# Patient Record
Sex: FEMALE | Race: WHITE | NOT HISPANIC OR LATINO | Employment: FULL TIME | URBAN - METROPOLITAN AREA
[De-identification: names, ages, dates, MRNs, and addresses within clinical notes are randomized per-mention and may not be internally consistent; named-entity substitution may affect disease eponyms.]

---

## 2017-08-02 ENCOUNTER — TRANSCRIBE ORDERS (OUTPATIENT)
Dept: ADMINISTRATIVE | Facility: HOSPITAL | Age: 42
End: 2017-08-02

## 2017-08-02 DIAGNOSIS — K63.1 PERFORATION OF INTESTINE (HCC): Primary | ICD-10-CM

## 2017-08-07 ENCOUNTER — HOSPITAL ENCOUNTER (OUTPATIENT)
Dept: CT IMAGING | Facility: HOSPITAL | Age: 42
Discharge: HOME | End: 2017-08-07
Attending: SURGERY | Admitting: SURGERY

## 2017-08-07 DIAGNOSIS — K63.1 PERFORATION OF INTESTINE (HCC): ICD-10-CM

## 2017-08-07 PROCEDURE — 74176 CT ABD & PELVIS W/O CONTRAST: CPT

## 2017-08-07 PROCEDURE — 0 DIATRIZOATE MEGLUMINE & SODIUM PER 1 ML: Performed by: SURGERY

## 2017-08-07 RX ADMIN — Medication 15 ML: at 15:30

## 2017-08-14 ENCOUNTER — TRANSCRIBE ORDERS (OUTPATIENT)
Dept: LAB | Facility: HOSPITAL | Age: 42
End: 2017-08-14

## 2017-08-14 ENCOUNTER — LAB (OUTPATIENT)
Dept: LAB | Facility: HOSPITAL | Age: 42
End: 2017-08-14

## 2017-08-14 DIAGNOSIS — Z01.818 PRE-OP EXAM: Primary | ICD-10-CM

## 2017-08-14 DIAGNOSIS — Z01.818 PRE-OP EXAM: ICD-10-CM

## 2017-08-14 LAB
ANION GAP SERPL CALCULATED.3IONS-SCNC: 5 MMOL/L (ref 3–11)
BUN BLD-MCNC: 11 MG/DL (ref 9–23)
BUN/CREAT SERPL: 15.7 (ref 7–25)
CALCIUM SPEC-SCNC: 9.4 MG/DL (ref 8.7–10.4)
CHLORIDE SERPL-SCNC: 105 MMOL/L (ref 99–109)
CO2 SERPL-SCNC: 29 MMOL/L (ref 20–31)
CREAT BLD-MCNC: 0.7 MG/DL (ref 0.6–1.3)
DEPRECATED RDW RBC AUTO: 42.4 FL (ref 37–54)
ERYTHROCYTE [DISTWIDTH] IN BLOOD BY AUTOMATED COUNT: 13.7 % (ref 11.3–14.5)
GFR SERPL CREATININE-BSD FRML MDRD: 92 ML/MIN/1.73
GLUCOSE BLD-MCNC: 79 MG/DL (ref 70–100)
HCT VFR BLD AUTO: 34.5 % (ref 34.5–44)
HGB BLD-MCNC: 11 G/DL (ref 11.5–15.5)
MCH RBC QN AUTO: 26.9 PG (ref 27–31)
MCHC RBC AUTO-ENTMCNC: 31.9 G/DL (ref 32–36)
MCV RBC AUTO: 84.4 FL (ref 80–99)
PLATELET # BLD AUTO: 337 10*3/MM3 (ref 150–450)
PMV BLD AUTO: 9.1 FL (ref 6–12)
POTASSIUM BLD-SCNC: 4.4 MMOL/L (ref 3.5–5.5)
RBC # BLD AUTO: 4.09 10*6/MM3 (ref 3.89–5.14)
SODIUM BLD-SCNC: 139 MMOL/L (ref 132–146)
WBC NRBC COR # BLD: 6.33 10*3/MM3 (ref 3.5–10.8)

## 2017-08-14 PROCEDURE — 80048 BASIC METABOLIC PNL TOTAL CA: CPT | Performed by: SURGERY

## 2017-08-14 PROCEDURE — 36415 COLL VENOUS BLD VENIPUNCTURE: CPT

## 2017-08-14 PROCEDURE — 85027 COMPLETE CBC AUTOMATED: CPT | Performed by: SURGERY

## 2017-08-28 ENCOUNTER — LAB REQUISITION (OUTPATIENT)
Dept: LAB | Facility: HOSPITAL | Age: 42
End: 2017-08-28

## 2017-08-28 DIAGNOSIS — L98.9 DISORDER OF SKIN OR SUBCUTANEOUS TISSUE: ICD-10-CM

## 2017-08-28 PROCEDURE — 88304 TISSUE EXAM BY PATHOLOGIST: CPT | Performed by: SURGERY

## 2017-08-29 LAB
CYTO UR: NORMAL
LAB AP CASE REPORT: NORMAL
LAB AP CLINICAL INFORMATION: NORMAL
Lab: NORMAL
PATH REPORT.FINAL DX SPEC: NORMAL
PATH REPORT.GROSS SPEC: NORMAL

## 2017-10-15 PROBLEM — Z01.419 WELL WOMAN EXAM: Status: ACTIVE | Noted: 2017-10-15

## 2017-10-16 ENCOUNTER — APPOINTMENT (OUTPATIENT)
Dept: LAB | Facility: HOSPITAL | Age: 42
End: 2017-10-16

## 2017-10-16 ENCOUNTER — OFFICE VISIT (OUTPATIENT)
Dept: OBSTETRICS AND GYNECOLOGY | Facility: CLINIC | Age: 42
End: 2017-10-16

## 2017-10-16 VITALS
SYSTOLIC BLOOD PRESSURE: 114 MMHG | HEIGHT: 66 IN | RESPIRATION RATE: 14 BRPM | WEIGHT: 179 LBS | BODY MASS INDEX: 28.77 KG/M2 | DIASTOLIC BLOOD PRESSURE: 74 MMHG

## 2017-10-16 DIAGNOSIS — Z01.419 WELL WOMAN EXAM: Primary | ICD-10-CM

## 2017-10-16 DIAGNOSIS — N81.2 UTEROVAGINAL PROLAPSE, INCOMPLETE: ICD-10-CM

## 2017-10-16 DIAGNOSIS — N39.46 MIXED URGE AND STRESS INCONTINENCE: ICD-10-CM

## 2017-10-16 LAB
BACTERIA UR QL AUTO: NORMAL /HPF
BILIRUB UR QL STRIP: NEGATIVE
CLARITY UR: CLEAR
COLOR UR: YELLOW
GLUCOSE UR STRIP-MCNC: NEGATIVE MG/DL
HGB UR QL STRIP.AUTO: NEGATIVE
HYALINE CASTS UR QL AUTO: NORMAL /LPF
KETONES UR QL STRIP: NEGATIVE
LEUKOCYTE ESTERASE UR QL STRIP.AUTO: NEGATIVE
NITRITE UR QL STRIP: NEGATIVE
PH UR STRIP.AUTO: 5.5 [PH] (ref 5–8)
PROT UR QL STRIP: NEGATIVE
RBC # UR: NORMAL /HPF
REF LAB TEST METHOD: NORMAL
SP GR UR STRIP: 1.02 (ref 1–1.03)
SQUAMOUS #/AREA URNS HPF: NORMAL /HPF
UROBILINOGEN UR QL STRIP: NORMAL
WBC UR QL AUTO: NORMAL /HPF

## 2017-10-16 PROCEDURE — 81001 URINALYSIS AUTO W/SCOPE: CPT | Performed by: OBSTETRICS & GYNECOLOGY

## 2017-10-16 PROCEDURE — 99396 PREV VISIT EST AGE 40-64: CPT | Performed by: OBSTETRICS & GYNECOLOGY

## 2017-10-16 NOTE — PROGRESS NOTES
Subjective   Chief Complaint   Patient presents with   • Gynecologic Exam     Paula Quispe is a 42 y.o. year old  presenting to be seen for her annual exam.     SEXUAL Hx:  She is currently sexually active.  In the past year there has not been new sexual partners.    Condoms are never used.  She would not like to be screened for STD's at today's exam.  Current birth control method: vasectomy.  She is happy with her current method of contraception and does not want to discuss alternative methods of contraception.  MENSTRUAL Hx:  Patient's last menstrual period was 2017 (exact date).  In the past 6 months her cycles have been regular, predictable and occur monthly.  Her menstrual flow is typically normal.   Each month on average there are roughly 0 day(s) of very heavy flow.    Intermenstrual bleeding is absent.    Post-coital bleeding is absent.  Dysmenorrhea: is not affecting her activities of daily living  PMS: is not affecting her activities of daily living  Her cycles are not a source of concern for her that she wishes to discuss today.  HEALTH Hx:  She exercises regularly: yes.  She wears her seat belt: yes.  She has concerns about domestic violence: no.  OTHER THINGS SHE WANTS TO DISCUSS TODAY:  Nothing else    The following portions of the patient's history were reviewed and updated as appropriate:problem list, current medications, allergies, past family history, past medical history, past social history and past surgical history.    Smoking status: Never Smoker                                                              Smokeless status: Never Used                        Review of Systems  Constitutional POS: nothing reported    NEG: anorexia or night sweats   Gastointestinal POS: nothing reported    NEG: bloating, change in bowel habits, melena or reflux symptoms   Genitourinary POS: both stress and urge incontinence are present and it IS effecting her ADL's    NEG: dysuria or hematuria  "  Integument POS: nothing reported    NEG: moles that are changing in size, shape, color or rashes   Breast POS: nothing reported    NEG: persistent breast lump, skin dimpling or nipple discharge        Objective   /74  Resp 14  Ht 66\" (167.6 cm)  Wt 179 lb (81.2 kg)  LMP 09/23/2017 (Exact Date)  Breastfeeding? No  BMI 28.89 kg/m2    General:  well developed; well nourished  no acute distress   Skin:  No suspicious lesions seen   Thyroid: normal to inspection and palpation   Breasts:  Examined in supine position  Symmetric without masses or skin dimpling  Nipples normal without inversion, lesions or discharge  There are no palpable axillary nodes   Abdomen: soft, non-tender; no masses  no umbilical or inginual hernias are present  no hepato-splenomegaly   Pelvis: Clinical staff was present for exam  External genitalia:  normal appearance of the external genitalia including Bartholin's and [place]'s glands.  :  urethral meatus normal; urethra hypermobile;  Vaginal:  normal pink mucosa without prolapse or lesions.  Cervix:  normal appearance.  Uterus:  normal size, shape and consistency. retroverted;  Adnexa:  non palpable bilaterally.  Rectal:  digital rectal exam not performed; anus visually normal appearing.  Cystocele GRADE 3  Rectocele GRADE 1  Uterine GRADE 2        Assessment   1. Pelvic organ prolapse - as compared to the prior exams it is progressively worse.  It is symptomatic and affecting her quality of life.  2. HARRIET - affecting her activities of daily living.     Plan   1. Pap was done today.  If she does not receive the results of the Pap within 2 weeks  time, she was instructed to call to find out the results.  I explained to Paula that the recommendations for Pap smear interval in a low risk patient's has lengthened to 3 years time.  I encouraged her to be seen yearly for a full physical exam including breast and pelvic exam even during the off years when PAP's will not be " performed.  2. She was encouraged to get yearly mammograms.  She should report any palpable breast lump(s) or skin changes regardless of mammographic findings.  I explained to Paula that notification regarding her mammogram results will come from the center performing the study.  Our office will not be routinely calling with mammogram results.  It is her responsibility to make sure that the results from the mammogram are communicated to her by the breast center.  If she has any questions about the results, she is welcome to call our office anytime.  3. The following tests were ordered today: UA and UA with culture if indicated.  It was explained to Paula that all lab test should be back within the one week after they are performed. She will be notified about the results, regardless of the findings. If she has not been contacted by the office within 2 weeks after the test has been performed, it is her responsibility to contact us to learn about her results.  4. The importance of keeping all planned follow-up and taking all medications as prescribed was emphasized.  5. Follow up with Urolog          This note was electronically signed.    Kerwin Paez M.D.  October 16, 2017    Note: Speech recognition transcription software may have been used to create portions of this document.  An attempt at proofreading has been made but errors in transcription could still be present.

## 2017-10-24 PROCEDURE — 87147 CULTURE TYPE IMMUNOLOGIC: CPT | Performed by: SURGERY

## 2017-10-24 PROCEDURE — 87075 CULTR BACTERIA EXCEPT BLOOD: CPT | Performed by: SURGERY

## 2017-10-24 PROCEDURE — 87077 CULTURE AEROBIC IDENTIFY: CPT | Performed by: SURGERY

## 2017-10-24 PROCEDURE — 87186 SC STD MICRODIL/AGAR DIL: CPT | Performed by: SURGERY

## 2017-10-24 PROCEDURE — 87205 SMEAR GRAM STAIN: CPT | Performed by: SURGERY

## 2017-10-24 PROCEDURE — 87070 CULTURE OTHR SPECIMN AEROBIC: CPT | Performed by: SURGERY

## 2017-10-25 ENCOUNTER — LAB REQUISITION (OUTPATIENT)
Dept: LAB | Facility: HOSPITAL | Age: 42
End: 2017-10-25

## 2017-10-25 DIAGNOSIS — L02.91 CUTANEOUS ABSCESS: ICD-10-CM

## 2017-10-30 LAB
BACTERIA SPEC AEROBE CULT: ABNORMAL
BACTERIA SPEC AEROBE CULT: ABNORMAL
GRAM STN SPEC: ABNORMAL
GRAM STN SPEC: ABNORMAL

## 2017-11-01 LAB — BACTERIA SPEC ANAEROBE CULT: NORMAL

## 2018-10-18 ENCOUNTER — OFFICE VISIT (OUTPATIENT)
Dept: OBSTETRICS AND GYNECOLOGY | Facility: CLINIC | Age: 43
End: 2018-10-18

## 2018-10-18 VITALS
WEIGHT: 181 LBS | RESPIRATION RATE: 14 BRPM | SYSTOLIC BLOOD PRESSURE: 116 MMHG | BODY MASS INDEX: 29.21 KG/M2 | DIASTOLIC BLOOD PRESSURE: 72 MMHG

## 2018-10-18 DIAGNOSIS — Z01.419 WELL WOMAN EXAM: Primary | ICD-10-CM

## 2018-10-18 PROCEDURE — 99396 PREV VISIT EST AGE 40-64: CPT | Performed by: OBSTETRICS & GYNECOLOGY

## 2018-10-18 NOTE — PROGRESS NOTES
Subjective   Chief Complaint   Patient presents with   • Gynecologic Exam     Paula Quispe is a 43 y.o. year old  presenting to be seen for her annual exam.     SEXUAL Hx:  She is currently sexually active.  In the past year there there has been NO new sexual partners.    Condoms are never used.  She would not like to be screened for STD's at today's exam.  Current birth control method: vasectomy.  She is happy with her current method of contraception and does not want to discuss alternative methods of contraception.  MENSTRUAL Hx:  Patient's last menstrual period was 10/01/2018 (approximate).  In the past 6 months her cycles have been regular, predictable and occur monthly.  Her menstrual flow is typically normal.   Each month on average there are roughly 0 day(s) of very heavy flow.    Intermenstrual bleeding is absent.    Post-coital bleeding is absent.  Dysmenorrhea: is not affecting her activities of daily living  PMS: is not affecting her activities of daily living  Her cycles are not a source of concern for her that she wishes to discuss today.  HEALTH Hx:  She exercises regularly: no (but is planning to start exercising more ).  She wears her seat belt: yes.  She has concerns about domestic violence: no.  OTHER THINGS SHE WANTS TO DISCUSS TODAY:  Nothing else    The following portions of the patient's history were reviewed and updated as appropriate:problem list, current medications, allergies, past family history, past medical history, past social history and past surgical history.    Smoking status: Never Smoker                                                              Smokeless tobacco: Never Used                        Review of Systems  Constitutional POS: nothing reported    NEG: anorexia or night sweats   Genitourinary POS: both stress and urge incontinence are present and it IS effecting her ADL's    NEG: dysuria or hematuria      Gastointestinal POS: nothing reported    NEG:  bloating, change in bowel habits, melena or reflux symptoms   Integument POS: nothing reported    NEG: moles that are changing in size, shape, color or rashes   Breast POS: nothing reported    NEG: persistent breast lump, skin dimpling or nipple discharge        Objective   /72   Resp 14   Wt 82.1 kg (181 lb)   LMP 10/01/2018 (Approximate)   Breastfeeding? No   BMI 29.21 kg/m²     General:  well developed; well nourished  no acute distress   Skin:  No suspicious lesions seen   Thyroid: normal to inspection and palpation   Breasts:  Examined in supine position  Symmetric without masses or skin dimpling  Nipples normal without inversion, lesions or discharge  There are no palpable axillary nodes   Abdomen: soft, non-tender; no masses  no umbilical or inginual hernias are present  no hepato-splenomegaly   Pelvis: Clinical staff was present for exam  External genitalia:  normal appearance of the external genitalia including Bartholin's and [place]'s glands.  :  urethral meatus normal;  Vaginal:  normal pink mucosa without prolapse or lesions.  Cervix:  normal appearance.  Uterus:  normal size, shape and consistency.  Adnexa:  non palpable bilaterally.  Rectal:  digital rectal exam not performed; anus visually normal appearing.  Cystocele GRADE 2  Rectocele GRADE 2  Uterine GRADE 2        Assessment   Pelvic organ prolapse - not significantly different from prior exams.  It is now symptomatic and affecting her quality of life.  HARRIET - affecting her activities of daily living.     Plan   1. Pap was not done today.  I explained to Paula that the recommendations for Pap smear interval in a low risk patient has lengthened to 3 years time.  I told Paula she still needs to be seen in our office yearly for a full physical including breast and pelvic exam.  2. She was encouraged to get yearly mammograms.  She should report any palpable breast lump(s) or skin changes regardless of mammographic findings.  I  explained to Paula that notification regarding her mammogram results will come from the center performing the study.  Our office will not be routinely calling with mammogram results.  It is her responsibility to make sure that the results from the mammogram are communicated to her by the breast center.  If she has any questions about the results, she is welcome to call our office anytime.  3. Still would recommend completing urinary log and following back up afterwards to determine next steps in the process to help with her bladder leakage  4. The importance of keeping all planned follow-up and taking all medications as prescribed was emphasized.  5. Follow up PRN or 1 year for annual exam            This note was electronically signed.    Kerwin Paez M.D.  October 18, 2018    Note: Speech recognition transcription software may have been used to create portions of this document.  An attempt at proofreading has been made but errors in transcription could still be present.

## 2019-06-19 ENCOUNTER — TRANSCRIBE ORDERS (OUTPATIENT)
Dept: ADMINISTRATIVE | Facility: HOSPITAL | Age: 44
End: 2019-06-19

## 2019-06-19 DIAGNOSIS — Z12.39 ENCOUNTER FOR SCREENING FOR MALIGNANT NEOPLASM OF BREAST: Primary | ICD-10-CM

## 2019-06-19 DIAGNOSIS — Z12.31 VISIT FOR SCREENING MAMMOGRAM: Primary | ICD-10-CM

## 2019-06-20 ENCOUNTER — HOSPITAL ENCOUNTER (OUTPATIENT)
Dept: MAMMOGRAPHY | Facility: HOSPITAL | Age: 44
Discharge: HOME | End: 2019-06-20
Admitting: OBSTETRICS & GYNECOLOGY

## 2019-06-20 DIAGNOSIS — Z12.31 VISIT FOR SCREENING MAMMOGRAM: ICD-10-CM

## 2019-06-20 PROCEDURE — 77063 BREAST TOMOSYNTHESIS BI: CPT

## 2019-06-20 PROCEDURE — 77063 BREAST TOMOSYNTHESIS BI: CPT | Performed by: RADIOLOGY

## 2019-06-20 PROCEDURE — 77067 SCR MAMMO BI INCL CAD: CPT

## 2019-06-20 PROCEDURE — 77067 SCR MAMMO BI INCL CAD: CPT | Performed by: RADIOLOGY

## 2019-10-20 NOTE — PROGRESS NOTES
Subjective   Chief Complaint   Patient presents with   • Gynecologic Exam     Paula Quispe is a 44 y.o. year old  presenting to be seen for her annual exam.     SEXUAL Hx:  She is currently sexually active.  In the past year there there has been NO new sexual partners.    Condoms are never used.  She would not like to be screened for STD's at today's exam.  Current birth control method: vasectomy.  She is happy with her current method of contraception and does not want to discuss alternative methods of contraception.  MENSTRUAL Hx:  Patient's last menstrual period was 10/12/2019 (approximate).  In the past 6 months her cycles have been regular, predictable and occur monthly.  Her menstrual flow is typically normal.   Each month on average there are roughly 0 day(s) of very heavy flow.    Intermenstrual bleeding is absent.    Post-coital bleeding is absent.  Dysmenorrhea: is not affecting her activities of daily living  PMS: is not affecting her activities of daily living  Her cycles are not a source of concern for her that she wishes to discuss today.  HEALTH Hx:  She exercises regularly: no (and has no plans to become more active).  She wears her seat belt: yes.  She has concerns about domestic violence: no.  OTHER THINGS SHE WANTS TO DISCUSS TODAY:  Nothing else    The following portions of the patient's history were reviewed and updated as appropriate:problem list, current medications, allergies, past family history, past medical history, past social history and past surgical history.    Social History    Tobacco Use      Smoking status: Never Smoker      Smokeless tobacco: Never Used    Review of Systems  Constitutional POS: nothing reported    NEG: anorexia or night sweats   Genitourinary POS: both stress and urge incontinence are  present but it IS NOT effecting her ADL's    NEG: dysuria or hematuria      Gastointestinal POS: diarrhea and it IS NOT effecting her daily living    NEG: bloating,  change in bowel habits, melena or reflux symptoms   Integument POS: nothing reported    NEG: moles that are changing in size, shape, color or rashes   Breast POS: nothing reported    NEG: persistent breast lump, skin dimpling or nipple discharge        Objective   /74   Resp 14   Wt 84.4 kg (186 lb)   LMP 10/12/2019 (Approximate)   Breastfeeding? No   BMI 30.02 kg/m²     General:  well developed; well nourished  no acute distress   Skin:  No suspicious lesions seen   Thyroid: normal to inspection and palpation   Breasts:  Examined in supine position  Symmetric without masses or skin dimpling  Nipples normal without inversion, lesions or discharge  There are no palpable axillary nodes   Abdomen: soft, non-tender; no masses  no umbilical or inguinal hernias are present  no hepato-splenomegaly   Pelvis: Clinical staff was present for exam  External genitalia:  normal appearance of the external genitalia including Bartholin's and [place]'s glands.  :  urethral meatus normal;  Vaginal:  normal pink mucosa without prolapse or lesions.  Cervix:  normal appearance.  Uterus:  normal size, shape and consistency.  Adnexa:  normal bimanual exam of the adnexa.  Rectal:  digital rectal exam not performed; anus visually normal appearing.  Cystocele GRADE 2  Rectocele GRADE 2  Uterine GRADE 2        Assessment   1. Normal GYN exam with pelvic organ prolapse - not significantly different from prior exams.  It is minimally symptomatic and not affecting her quality of life.  2. HARRIET - not impacting day-to-day function enough that she wishes to pursue     Plan   1. Pap was not done today.  I explained to Paula that the recommendations for Pap smear interval in a low risk patient has lengthened to 3 years time.  I told Paula she still needs to be seen in our office yearly for a full physical including breast and pelvic exam.  2. She was encouraged to get yearly mammograms.  She should report any palpable breast lump(s)  or skin changes regardless of mammographic findings.  I explained to Paula that notification regarding her mammogram results will come from the center performing the study.  Our office will not be routinely calling with mammogram results.  It is her responsibility to make sure that the results from the mammogram are communicated to her by the breast center.  If she has any questions about the results, she is welcome to call our office anytime.  3. The importance of keeping all planned follow-up and taking all medications as prescribed was emphasized.  4. Follow up for annual exam 1 year         This note was electronically signed.    Kerwin Paez M.D.  October 23, 2019    Note: Speech recognition transcription software may have been used to create portions of this document.  An attempt at proofreading has been made but errors in transcription could still be present.

## 2019-10-23 ENCOUNTER — OFFICE VISIT (OUTPATIENT)
Dept: OBSTETRICS AND GYNECOLOGY | Facility: CLINIC | Age: 44
End: 2019-10-23

## 2019-10-23 VITALS
BODY MASS INDEX: 30.02 KG/M2 | SYSTOLIC BLOOD PRESSURE: 118 MMHG | RESPIRATION RATE: 14 BRPM | WEIGHT: 186 LBS | DIASTOLIC BLOOD PRESSURE: 74 MMHG

## 2019-10-23 DIAGNOSIS — Z01.419 WELL WOMAN EXAM: Primary | ICD-10-CM

## 2019-10-23 PROCEDURE — 99396 PREV VISIT EST AGE 40-64: CPT | Performed by: OBSTETRICS & GYNECOLOGY

## 2020-10-26 NOTE — PATIENT INSTRUCTIONS
Tdap Vaccine (Tetanus, Diphtheria and Pertussis): What You Need to Know      1. Why get vaccinated?  Tetanus, diphtheria and pertussis are very serious diseases. Tdap vaccine can protect us from these diseases. And, Tdap vaccine given to pregnant women can protect  babies against pertussis..  TETANUS (Lockjaw) is rare in the [place] today. It causes painful muscle tightening and stiffness, usually all over the body.  · It can lead to tightening of muscles in the head and neck so you can't open your mouth, swallow, or sometimes even breathe. Tetanus kills about 1 out of 10 people who are infected even after receiving the best medical care.  DIPHTHERIA is also rare in the [place] today. It can cause a thick coating to form in the back of the throat.  · It can lead to breathing problems, heart failure, paralysis, and death.  PERTUSSIS (Whooping Cough) causes severe coughing spells, which can cause difficulty breathing, vomiting and disturbed sleep.  · It can also lead to weight loss, incontinence, and rib fractures. Up to 2 in 100 adolescents and 5 in 100 adults with pertussis are hospitalized or have complications, which could include pneumonia or death.    These diseases are caused by bacteria. Diphtheria and pertussis are spread from person to person through secretions from coughing or sneezing. Tetanus enters the body through cuts, scratches, or wounds.  Before vaccines, as many as 200,000 cases of diphtheria, 200,000 cases of pertussis, and hundreds of cases of tetanus, were reported in the [place] each year. Since vaccination began, reports of cases for tetanus and diphtheria have dropped by about 99% and for pertussis by about 80%.    2. Tdap vaccine  • Tdap vaccine can protect adolescents and adults from tetanus, diphtheria, and pertussis. One dose of Tdap is routinely given at age 11 or 12. People who did not get Tdap at that age should get it as soon as possible.  • Tdap is especially  important for healthcare professionals and anyone having close contact with a baby younger than 12 months.  • Pregnant women should get a dose of Tdap during every pregnancy, to protect the  from pertussis. Infants are most at risk for severe, life-threatening complications from pertussis.  • Another vaccine, called Td, protects against tetanus and diphtheria, but not pertussis. A Td booster should be given every 10 years. Tdap may be given as one of these boosters if you have never gotten Tdap before. Tdap may also be given after a severe cut or burn to prevent tetanus infection.  • Your doctor or the person giving you the vaccine can give you more information.  • Tdap may safely be given at the same time as other vaccines.    3. Some people should not get this vaccine  · A person who has ever had a life-threatening allergic reaction after a previous dose of any diphtheria, tetanus or pertussis containing vaccine, OR has a severe allergy to any part of this vaccine, should not get Tdap vaccine. Tell the person giving the vaccine about any severe allergies.  · Anyone who had coma or long repeated seizures within 7 days after a childhood dose of DTP or DTaP, or a previous dose of Tdap, should not get Tdap, unless a cause other than the vaccine was found. They can still get Td.  · Talk to your doctor if you:  ? have seizures or another nervous system problem,  ? had severe pain or swelling after any vaccine containing diphtheria, tetanus or pertussis,  ? ever had a condition called Guillain-Barré Syndrome (GBS),  ? aren't feeling well on the day the shot is scheduled.    4. Risks  With any medicine, including vaccines, there is a chance of side effects. These are usually mild and go away on their own. Serious reactions are also possible but are rare.  Most people who get Tdap vaccine do not have any problems with it.  Mild problems following Tdap  (Did not interfere with activities)  · Pain where the shot was  given (about 3 in 4 adolescents or 2 in 3 adults)  · Redness or swelling where the shot was given (about 1 person in 5)  · Mild fever of at least 100.4°F (up to about 1 in 25 adolescents or 1 in 100 adults)  · Headache (about 3 or 4 people in 10)  · Tiredness (about 1 person in 3 or 4)  · Nausea, vomiting, diarrhea, stomach ache (up to 1 in 4 adolescents or 1 in 10 adults)  · Chills, sore joints (about 1 person in 10)  · Body aches (about 1 person in 3 or 4)  · Rash, swollen glands (uncommon)  Moderate problems following Tdap  (Interfered with activities, but did not require medical attention)  · Pain where the shot was given (up to 1 in 5 or 6)  · Redness or swelling where the shot was given (up to about 1 in 16 adolescents or 1 in 12 adults)  · Fever over 102°F (about 1 in 100 adolescents or 1 in 250 adults)  · Headache (about 1 in 7 adolescents or 1 in 10 adults)  · Nausea, vomiting, diarrhea, stomach ache (up to 1 or 3 people in 100)  · Swelling of the entire arm where the shot was given (up to about 1 in 500).  Severe problems following Tdap  (Unable to perform usual activities; required medical attention)  · Swelling, severe pain, bleeding and redness in the arm where the shot was given (rare).  Problems that could happen after any vaccine:  · People sometimes faint after a medical procedure, including vaccination. Sitting or lying down for about 15 minutes can help prevent fainting, and injuries caused by a fall. Tell your doctor if you feel dizzy, or have vision changes or ringing in the ears.  · Some people get severe pain in the shoulder and have difficulty moving the arm where a shot was given. This happens very rarely.  · Any medication can cause a severe allergic reaction. Such reactions from a vaccine are very rare, estimated at fewer than 1 in a million doses, and would happen within a few minutes to a few hours after the vaccination.  · As with any medicine, there is a very remote chance of a vaccine  causing a serious injury or death.  · The safety of vaccines is always being monitored. For more information, visit: www.cdc.gov/vaccinesafety/    5. What if there is a serious problem?  What should I look for?  · Look for anything that concerns you, such as signs of a severe allergic reaction, very high fever, or unusual behavior.  · Signs of a severe allergic reaction can include hives, swelling of the face and throat, difficulty breathing, a fast heartbeat, dizziness, and weakness. These would usually start a few minutes to a few hours after the vaccination.  What should I do?  · If you think it is a severe allergic reaction or other emergency that can't wait, call 9-1-1 or get the person to the nearest hospital. Otherwise, call your doctor.  · Afterward, the reaction should be reported to the Vaccine Adverse Event Reporting System (VAERS). Your doctor might file this report, or you can do it yourself through the VAERS web site at www.vaers.[facility].gov, or by calling 1-301.973.3029.  · VAERS does not give medical advice.    6. The National Vaccine Injury Compensation Program  The National Vaccine Injury Compensation Program (VICP) is a federal program that was created to compensate people who may have been injured by certain vaccines.  Persons who believe they may have been injured by a vaccine can learn about the program and about filing a claim by calling 1-853.124.3820 or visiting the VICP website at www.hrsa.gov/vaccinecompensation. There is a time limit to file a claim for compensation.    7. How can I learn more?  · Ask your doctor. He or she can give you the vaccine package insert or suggest other sources of information.  · Call your local or state health department.  · Contact the Centers for Disease Control and Prevention (CDC):  ? Call 1-645.239.9903 (2-578-XDL-INFO) or  ? Visit CDC's website at www.cdc.gov/vaccines      Vaccine Information Statement Tdap Vaccine (2/24/2015)  This information is not  intended to replace advice given to you by your health care provider. Make sure you discuss any questions you have with your health care provider.  Document Released: 06/18/2013 Document Revised: 08/05/2019 Document Reviewed: 08/05/2019  [employer]vier Interactive Patient Education © 2019 [employer]

## 2020-10-26 NOTE — PROGRESS NOTES
Subjective   Chief Complaint   Patient presents with   • Gynecologic Exam     Paula Quispe is a 45 y.o. year old  presenting to be seen for her annual exam.     She has been having some back and left-sided sciatic pain.  She is afraid it could be recurrence of her disc disease for which she had operation on in the past.    SEXUAL Hx:  She is currently sexually active.  In the past year there there has been NO new sexual partners.    Condoms are never used.  She would not like to be screened for STD's at today's exam.  Current birth control method: vasectomy.  She is happy with her current method of contraception and does not want to discuss alternative methods of contraception.  MENSTRUAL Hx:  Patient's last menstrual period was 2020 (approximate).  In the past 6 months her cycles have been regular, predictable and occur monthly.  Her menstrual flow is typically normal.   Each month on average there are roughly 0 day(s) of very heavy flow.  Intermenstrual bleeding is absent.    Post-coital bleeding is absent.  Dysmenorrhea: is not affecting her activities of daily living  PMS: is not affecting her activities of daily living  Her cycles are not a source of concern for her that she wishes to discuss today.  HEALTH Hx:  She exercises regularly: no (and has no plans to become more active).  She wears her seat belt: yes.  She has concerns about domestic violence: no.  OTHER THINGS SHE WANTS TO DISCUSS TODAY:  Nothing else    The following portions of the patient's history were reviewed and updated as appropriate:problem list, current medications, allergies, past family history, past medical history, past social history and past surgical history.    Social History    Tobacco Use      Smoking status: Never Smoker      Smokeless tobacco: Never Used    Review of Systems  Constitutional POS: nothing reported    NEG: anorexia or night sweats   Genitourinary POS: PANCHO is present but it IS NOT effecting her  ADL's    NEG: dysuria or hematuria      Gastointestinal POS: nothing reported    NEG: bloating, change in bowel habits, melena or reflux symptoms   Integument POS: nothing reported    NEG: moles that are changing in size, shape, color or rashes   Breast POS: nothing reported    NEG: persistent breast lump, skin dimpling or nipple discharge        Objective   /70   Resp 14   Wt 87.1 kg (192 lb)   LMP 09/29/2020 (Approximate)   Breastfeeding No   BMI 30.99 kg/m²     General:  well developed; well nourished  no acute distress   Skin:  No suspicious lesions seen   Thyroid: normal to inspection and palpation   Breasts:  Examined in supine position  Symmetric without masses or skin dimpling  Nipples normal without inversion, lesions or discharge  There are no palpable axillary nodes   Abdomen: soft, non-tender; no masses  no umbilical or inguinal hernias are present  no hepato-splenomegaly   Pelvis: Clinical staff was present for exam  External genitalia:  normal appearance of the external genitalia including Bartholin's and [place]'s glands.  :  urethral meatus normal;  Vaginal:  normal pink mucosa without prolapse or lesions.  Cervix:  normal appearance.  Uterus:  normal size, shape and consistency. retroverted;  Adnexa:  normal bimanual exam of the adnexa.  Rectal:  digital rectal exam not performed; anus visually normal appearing.  Cystocele GRADE 2  Rectocele GRADE 2  Uterine GRADE 2        Assessment   1. Normal GYN exam with pelvic organ prolapse - not significantly different from prior exams.  It is minimally symptomatic and not affecting her quality of life.  2. HARRIET - not significantly affecting her activities of daily living.     Plan   1. Pap was done today.  If she does not receive the results of the Pap within 2 weeks  time, she was instructed to call to find out the results.  I explained to Paula that the recommendations for Pap smear interval in a low risk patient's has lengthened to 3 years  time.  I encouraged her to be seen yearly for a full physical exam including breast and pelvic exam even during the off years when PAP's will not be performed.  2. She was encouraged to get yearly mammograms.  She should report any palpable breast lump(s) or skin changes regardless of mammographic findings.  I explained to Paula that notification regarding her mammogram results will come from the center performing the study.  Our office will not be routinely calling with mammogram results.  It is her responsibility to make sure that the results from the mammogram are communicated to her by the breast center.  If she has any questions about the results, she is welcome to call our office anytime.  3. Colonoscopy was recommended for screening for colon cancer.  The procedure was briefly discussed and its benefits for early detection of colon cancer were emphasized.  I explained to Paula that we could help her to schedule it if she wishes.  Additionally, she could also contact her primary care physician to help make this arrangement.  Alternatively, she could consider Cologuard (which would need to be done every 3 years).  If Cologuard was abnormal, colonoscopy would be required in follow-up.  After considering these options she wants help setting up her colonoscopy.  Referral will be made to Dr. Page for outpatient colonoscopy.  4. I discussed with Paula that she may be behind on needed vaccinations for TDAP.  She may be able to obtain these vaccinations at her local pharmacy OR speak about obtaining them with her primary care.  If she does obtain her vaccines, I have asked Paula to let us know the date each vaccine was obtained so that her medical record could be updated in our system.  5. The importance of keeping all planned follow-up and taking all medications as prescribed was emphasized.  6. Follow up for annual exam 1 year         This note was electronically signed.    Kerwin Paez,  M.D.  October 28, 2020    Note: Speech recognition transcription software may have been used to create portions of this document.  An attempt at proofreading has been made but errors in transcription could still be present.

## 2020-10-28 ENCOUNTER — OFFICE VISIT (OUTPATIENT)
Dept: OBSTETRICS AND GYNECOLOGY | Facility: CLINIC | Age: 45
End: 2020-10-28

## 2020-10-28 VITALS
RESPIRATION RATE: 14 BRPM | WEIGHT: 192 LBS | BODY MASS INDEX: 30.99 KG/M2 | SYSTOLIC BLOOD PRESSURE: 118 MMHG | DIASTOLIC BLOOD PRESSURE: 70 MMHG

## 2020-10-28 DIAGNOSIS — N81.2 UTEROVAGINAL PROLAPSE, INCOMPLETE: ICD-10-CM

## 2020-10-28 DIAGNOSIS — N39.46 MIXED URGE AND STRESS INCONTINENCE: ICD-10-CM

## 2020-10-28 DIAGNOSIS — Z71.85 VACCINE COUNSELING: ICD-10-CM

## 2020-10-28 DIAGNOSIS — Z01.419 WELL WOMAN EXAM: Primary | ICD-10-CM

## 2020-10-28 DIAGNOSIS — Z12.11 SCREEN FOR COLON CANCER: ICD-10-CM

## 2020-10-28 PROCEDURE — 99396 PREV VISIT EST AGE 40-64: CPT | Performed by: OBSTETRICS & GYNECOLOGY

## 2020-11-29 ENCOUNTER — APPOINTMENT (OUTPATIENT)
Dept: PREADMISSION TESTING | Facility: HOSPITAL | Age: 45
End: 2020-11-29

## 2021-03-08 ENCOUNTER — APPOINTMENT (OUTPATIENT)
Dept: PREADMISSION TESTING | Facility: HOSPITAL | Age: 46
End: 2021-03-08

## 2021-03-22 DIAGNOSIS — Z12.11 SCREENING FOR COLON CANCER: Primary | ICD-10-CM

## 2021-05-14 DIAGNOSIS — Z12.11 SCREENING FOR COLON CANCER: ICD-10-CM

## 2021-05-19 ENCOUNTER — OUTSIDE FACILITY SERVICE (OUTPATIENT)
Dept: GASTROENTEROLOGY | Facility: CLINIC | Age: 46
End: 2021-05-19

## 2021-05-19 PROCEDURE — 45380 COLONOSCOPY AND BIOPSY: CPT | Performed by: INTERNAL MEDICINE

## 2021-05-19 PROCEDURE — 88305 TISSUE EXAM BY PATHOLOGIST: CPT | Performed by: INTERNAL MEDICINE

## 2021-05-20 ENCOUNTER — LAB REQUISITION (OUTPATIENT)
Dept: LAB | Facility: HOSPITAL | Age: 46
End: 2021-05-20

## 2021-05-20 DIAGNOSIS — Z12.11 ENCOUNTER FOR SCREENING FOR MALIGNANT NEOPLASM OF COLON: ICD-10-CM

## 2021-05-21 LAB
CYTO UR: NORMAL
LAB AP CASE REPORT: NORMAL
LAB AP CLINICAL INFORMATION: NORMAL
PATH REPORT.FINAL DX SPEC: NORMAL
PATH REPORT.GROSS SPEC: NORMAL

## 2021-09-23 ENCOUNTER — LAB (OUTPATIENT)
Dept: LAB | Facility: HOSPITAL | Age: 46
End: 2021-09-23

## 2021-09-23 ENCOUNTER — OFFICE VISIT (OUTPATIENT)
Dept: GASTROENTEROLOGY | Facility: CLINIC | Age: 46
End: 2021-09-23

## 2021-09-23 VITALS
HEIGHT: 66 IN | TEMPERATURE: 97.1 F | WEIGHT: 156 LBS | BODY MASS INDEX: 25.07 KG/M2 | OXYGEN SATURATION: 94 % | SYSTOLIC BLOOD PRESSURE: 122 MMHG | DIASTOLIC BLOOD PRESSURE: 68 MMHG | HEART RATE: 86 BPM

## 2021-09-23 DIAGNOSIS — K56.600 PARTIAL INTESTINAL OBSTRUCTION, UNSPECIFIED CAUSE (HCC): Primary | ICD-10-CM

## 2021-09-23 DIAGNOSIS — K56.600 PARTIAL INTESTINAL OBSTRUCTION, UNSPECIFIED CAUSE (HCC): ICD-10-CM

## 2021-09-23 PROBLEM — M20.10 ACQUIRED HALLUX VALGUS: Status: ACTIVE | Noted: 2021-09-23

## 2021-09-23 PROBLEM — L57.0 ACTINIC KERATOSIS: Status: ACTIVE | Noted: 2021-09-23

## 2021-09-23 LAB
ALBUMIN SERPL-MCNC: 4.1 G/DL (ref 3.5–5.2)
ALBUMIN/GLOB SERPL: 1.4 G/DL
ALP SERPL-CCNC: 81 U/L (ref 39–117)
ALT SERPL W P-5'-P-CCNC: 27 U/L (ref 1–33)
ANION GAP SERPL CALCULATED.3IONS-SCNC: 10.8 MMOL/L (ref 5–15)
AST SERPL-CCNC: 25 U/L (ref 1–32)
BASOPHILS # BLD AUTO: 0.03 10*3/MM3 (ref 0–0.2)
BASOPHILS NFR BLD AUTO: 0.5 % (ref 0–1.5)
BILIRUB SERPL-MCNC: 0.3 MG/DL (ref 0–1.2)
BUN SERPL-MCNC: 10 MG/DL (ref 6–20)
BUN/CREAT SERPL: 13.5 (ref 7–25)
CALCIUM SPEC-SCNC: 8.8 MG/DL (ref 8.6–10.5)
CHLORIDE SERPL-SCNC: 107 MMOL/L (ref 98–107)
CO2 SERPL-SCNC: 23.2 MMOL/L (ref 22–29)
CREAT SERPL-MCNC: 0.74 MG/DL (ref 0.57–1)
DEPRECATED RDW RBC AUTO: 38.9 FL (ref 37–54)
EOSINOPHIL # BLD AUTO: 0.11 10*3/MM3 (ref 0–0.4)
EOSINOPHIL NFR BLD AUTO: 2 % (ref 0.3–6.2)
ERYTHROCYTE [DISTWIDTH] IN BLOOD BY AUTOMATED COUNT: 12.8 % (ref 12.3–15.4)
GFR SERPL CREATININE-BSD FRML MDRD: 84 ML/MIN/1.73
GLOBULIN UR ELPH-MCNC: 2.9 GM/DL
GLUCOSE SERPL-MCNC: 93 MG/DL (ref 65–99)
HCT VFR BLD AUTO: 33.4 % (ref 34–46.6)
HGB BLD-MCNC: 11.1 G/DL (ref 12–15.9)
IMM GRANULOCYTES # BLD AUTO: 0.02 10*3/MM3 (ref 0–0.05)
IMM GRANULOCYTES NFR BLD AUTO: 0.4 % (ref 0–0.5)
LYMPHOCYTES # BLD AUTO: 1.02 10*3/MM3 (ref 0.7–3.1)
LYMPHOCYTES NFR BLD AUTO: 18.4 % (ref 19.6–45.3)
MCH RBC QN AUTO: 28.5 PG (ref 26.6–33)
MCHC RBC AUTO-ENTMCNC: 33.2 G/DL (ref 31.5–35.7)
MCV RBC AUTO: 85.6 FL (ref 79–97)
MONOCYTES # BLD AUTO: 0.41 10*3/MM3 (ref 0.1–0.9)
MONOCYTES NFR BLD AUTO: 7.4 % (ref 5–12)
NEUTROPHILS NFR BLD AUTO: 3.96 10*3/MM3 (ref 1.7–7)
NEUTROPHILS NFR BLD AUTO: 71.3 % (ref 42.7–76)
NRBC BLD AUTO-RTO: 0 /100 WBC (ref 0–0.2)
PLATELET # BLD AUTO: 322 10*3/MM3 (ref 140–450)
PMV BLD AUTO: 9.4 FL (ref 6–12)
POTASSIUM SERPL-SCNC: 3.3 MMOL/L (ref 3.5–5.2)
PROT SERPL-MCNC: 7 G/DL (ref 6–8.5)
RBC # BLD AUTO: 3.9 10*6/MM3 (ref 3.77–5.28)
SODIUM SERPL-SCNC: 141 MMOL/L (ref 136–145)
VIT B12 BLD-MCNC: 360 PG/ML (ref 211–946)
WBC # BLD AUTO: 5.55 10*3/MM3 (ref 3.4–10.8)

## 2021-09-23 PROCEDURE — 82607 VITAMIN B-12: CPT

## 2021-09-23 PROCEDURE — 83516 IMMUNOASSAY NONANTIBODY: CPT

## 2021-09-23 PROCEDURE — 86255 FLUORESCENT ANTIBODY SCREEN: CPT

## 2021-09-23 PROCEDURE — 86704 HEP B CORE ANTIBODY TOTAL: CPT

## 2021-09-23 PROCEDURE — 87340 HEPATITIS B SURFACE AG IA: CPT

## 2021-09-23 PROCEDURE — 36415 COLL VENOUS BLD VENIPUNCTURE: CPT

## 2021-09-23 PROCEDURE — 81335 TPMT GENE COM VARIANTS: CPT

## 2021-09-23 PROCEDURE — 99214 OFFICE O/P EST MOD 30 MIN: CPT | Performed by: INTERNAL MEDICINE

## 2021-09-23 PROCEDURE — 80053 COMPREHEN METABOLIC PANEL: CPT

## 2021-09-23 PROCEDURE — 86480 TB TEST CELL IMMUN MEASURE: CPT

## 2021-09-23 PROCEDURE — 85025 COMPLETE CBC W/AUTO DIFF WBC: CPT

## 2021-09-23 PROCEDURE — 86140 C-REACTIVE PROTEIN: CPT

## 2021-09-23 PROCEDURE — 86671 FUNGUS NES ANTIBODY: CPT

## 2021-09-23 RX ORDER — OMEGA-3S/DHA/EPA/FISH OIL/D3 300MG-1000
400 CAPSULE ORAL DAILY
COMMUNITY
Start: 2021-08-29 | End: 2021-11-02

## 2021-09-23 RX ORDER — DEXAMETHASONE 6 MG/1
6 TABLET ORAL DAILY
COMMUNITY
Start: 2021-08-29 | End: 2021-11-02

## 2021-09-23 RX ORDER — POTASSIUM CHLORIDE 20 MEQ/1
TABLET, EXTENDED RELEASE ORAL
COMMUNITY
Start: 2021-08-29 | End: 2021-11-02

## 2021-09-23 RX ORDER — ASCORBIC ACID 500 MG
500 TABLET ORAL 2 TIMES DAILY
COMMUNITY
Start: 2021-08-29 | End: 2021-11-02

## 2021-09-23 RX ORDER — ZINC SULFATE 50(220)MG
1 CAPSULE ORAL DAILY
COMMUNITY
Start: 2021-08-29 | End: 2021-11-02

## 2021-09-23 NOTE — PROGRESS NOTES
PCP: Paula Espinoza APRN    Chief Complaint   Patient presents with   • Hospital Follow Up Visit     bowel obstruction        History of Present Illness:   Paula Quispe is a 46 y.o. female who presents to GI clinic as a follow up from her screening colonoscopy. She has a complicated history with an abdominal mass resection by Dr. Hughes in 3/2016 in which she had a small bowel obstruction with sigmoid colectomy, ileostomy with mucous fistula.  Pathology benign inflammation.  She reports following > 5 years ago with Dr. Kelly who thought she might have crohn's disease. She then followed with Dr. Harkins and was told she might not.      At the time of my colonoscopy 2021 she was noted to be on minimal to no nsaids and had 3 apthous ulcers at right sided anastamosis.  She reports 6 liquid bms a day.  She has no abdominal pain until she has an obstruction. She reports one psbo 1 month ago at her local hospital treated conservatively with ng tube and pain control.  Feels well. Denies gib loss.    Past Medical History:   Diagnosis Date   • Chronic constipation     Resolved after partial colectomy   • Endometriosis        Past Surgical History:   Procedure Laterality Date   •  SECTION     • COLONOSCOPY     • DIAGNOSTIC LAPAROSCOPY      Dx with endometriosis   • ILEOSTOMY CLOSURE  2016    Dr. Hughes - Takedown   • LUMBAR DISCECTOMY     • SUBTOTAL COLECTOMY  2016    Dr. Hughes - Sigmoid colectomy for bowel obstruction with mucous fistula creastion   • TONSILLECTOMY           Current Outpatient Medications:   •  ascorbic acid (VITAMIN C) 500 MG tablet, Take 500 mg by mouth 2 (Two) Times a Day., Disp: , Rfl:   •  cholecalciferol (VITAMIN D3) 10 MCG (400 UNIT) tablet, Take 400 Units by mouth Daily., Disp: , Rfl:   •  dexamethasone (DECADRON) 6 MG tablet, Take 6 mg by mouth Daily., Disp: , Rfl:   •  potassium chloride (K-DUR,KLOR-CON) 20 MEQ CR tablet, TAKE 2 TABLETS BY  MOUTH NOW AS A ONE TIME DOSE, Disp: , Rfl:   •  Sod Picosulfate-Mag Ox-Cit Acd 10-3.5-12 MG-GM -GM/160ML solution, Take 1 kit by mouth Take As Directed. Follow instructions that were mailed to your home. If you didn't receive these call [phone]., Disp: 320 mL, Rfl: 0  •  tretinoin (RETIN-A) 0.025 % cream, APPLY A PEA SIZE TO FACE EVERY OTHER NIGHT INCREASE TO EVERY NIGHT IF TOLERATED, Disp: , Rfl:   •  Zinc 220 (50 Zn) MG capsule, Take 1 capsule by mouth Daily., Disp: , Rfl:     Allergies   Allergen Reactions   • Phenergan [Promethazine Hcl] Hives       Family History   Problem Relation Age of Onset   • Cervical cancer Mother    • Breast cancer Maternal Aunt 70   • Breast cancer Maternal Aunt 80   • Ovarian cancer Neg Hx    • Colon cancer Neg Hx    • Colon polyps Neg Hx    • Esophageal cancer Neg Hx        Social History     Socioeconomic History   • Marital status:      Spouse name: Not on file   • Number of children: Not on file   • Years of education: Not on file   • Highest education level: Not on file   Tobacco Use   • Smoking status: Never Smoker   • Smokeless tobacco: Never Used   Vaping Use   • Vaping Use: Never used   Substance and Sexual Activity   • Alcohol use: No   • Drug use: No       Review of Systems  A complete 12 point ros was asked and is negative except for that mentioned above.  In particular:  No fever  No rash  No increased arthralgias  No worsening edema  No cough  No dyspnea  No chest pain      Vitals:    09/23/21 1505   BP: 122/68   Pulse: 86   Temp: 97.1 °F (36.2 °C)   SpO2: 94%       Physical Exam  General: well developed, well nourished  A+O x 3 NAD  HEENT: NCAT, pupils equal appearing, sclera appear white  NECK: full ROM  Respiratory: symmetric chest rise, normal effort, normal work of breathing, no overt rales  Abomen: non-distended  Skin: normal color, no jaundice  Neuro: no tremor, no facial droop  Psych: normal mood and affect  Abdominal scar      Assessment/Plan  1.)  History of colon resection  2.) Apthous ulcers at surgical anastamosis  3.) History of benign abdominal mass, inflammatory  4.) history of poorly healing ileostomy wound   5.) Chronic diarrhea with loss of icv  6.) Recurrent psbo, last 1 month ago    Overall clinical picture appears as though crohn's disease is a possibility. Either a minor crohn's combined with adhesive scar or a stricturing/perforation phenotype.  She takes nsaids infrequently.    Plan:  - CTE  - cbc, cmp, crp  - workup for antitnf including tspot, hep b  - if no overt obstruction, will hold on surgical referral and institute carlin Page MD  9/23/2021

## 2021-09-24 LAB
CRP SERPL-MCNC: <0.3 MG/DL (ref 0–0.5)
HBV CORE AB SERPL QL IA: NEGATIVE
HBV SURFACE AG SERPL QL IA: NORMAL

## 2021-09-29 LAB
BAKER'S YEAST IGG QN IA: 64 UNITS (ref 0–50)
CHITOBIOSIDE IGA SERPL IA-ACNC: 31 UNITS (ref 0–90)
GAMMA INTERFERON BACKGROUND BLD IA-ACNC: 0.04 IU/ML
LABORATORY COMMENT REPORT: ABNORMAL
LAMINARIBIOSIDE IGG SERPL IA-ACNC: 5 UNITS (ref 0–60)
M TB IFN-G BLD-IMP: NEGATIVE
M TB IFN-G CD4+ BCKGRND COR BLD-ACNC: 0.06 IU/ML
M TB IFN-G CD4+CD8+ BCKGRND COR BLD-ACNC: 0.06 IU/ML
MANNOBIOSIDE IGG SERPL IA-ACNC: 36 UNITS (ref 0–100)
MITOGEN IGNF BLD-ACNC: >10 IU/ML
P-ANCA ATYPICAL SER QL IF: NEGATIVE
SERVICE CMNT-IMP: NORMAL

## 2021-09-30 LAB
DIAGNOSTIC IMP SPEC-IMP: NORMAL
LABORATORY COMMENT REPORT: NORMAL
TPMT GENE MUT ANL BLD/T: NORMAL
TPMT GENE MUT ANL BLD/T: NORMAL

## 2021-11-02 ENCOUNTER — OFFICE VISIT (OUTPATIENT)
Dept: OBSTETRICS AND GYNECOLOGY | Facility: CLINIC | Age: 46
End: 2021-11-02

## 2021-11-02 VITALS
DIASTOLIC BLOOD PRESSURE: 74 MMHG | WEIGHT: 162 LBS | SYSTOLIC BLOOD PRESSURE: 126 MMHG | RESPIRATION RATE: 14 BRPM | BODY MASS INDEX: 26.15 KG/M2

## 2021-11-02 DIAGNOSIS — N39.46 MIXED URGE AND STRESS INCONTINENCE: ICD-10-CM

## 2021-11-02 DIAGNOSIS — Z71.85 VACCINE COUNSELING: ICD-10-CM

## 2021-11-02 DIAGNOSIS — N81.2 UTEROVAGINAL PROLAPSE, INCOMPLETE: ICD-10-CM

## 2021-11-02 DIAGNOSIS — Z01.419 WELL WOMAN EXAM: Primary | ICD-10-CM

## 2021-11-02 PROCEDURE — 99396 PREV VISIT EST AGE 40-64: CPT | Performed by: OBSTETRICS & GYNECOLOGY

## 2021-11-02 NOTE — PROGRESS NOTES
Subjective   Chief Complaint   Patient presents with   • Gynecologic Exam     Paula Quispe is a 46 y.o. year old  presenting to be seen for her annual exam, prolapse and HARRIET. Previously mammography was recommended.  She believes it has not been done but knows it is past due.    She has continued to have issues with her bowels.  She was admitted this past year with a small bowel obstruction.  She was in [place], [place] and managed with an NG tube and expectant management.  It did resolve.  She has been back in touch with Dr. Hughes her colorectal surgeon as well as is now seeing Dr. Page with gastroenterology to try to figure out if she has some form of colitis that is contributing.    Incontinence is still a problem.  She does leak at times.  She has both urgency and frequency along with stress incontinence.  At this point it is not impacting day-to-day function enough to pursue treatment    SEXUAL Hx:  She is currently sexually active.  In the past year there there has been NO new sexual partners.    Condoms are never used.  She would not like to be screened for STD's at today's exam.  Current birth control method: vasectomy.  She is happy with her current method of contraception and does not want to discuss alternative methods of contraception.  MENSTRUAL Hx:  Patient's last menstrual period was 10/15/2021 (approximate).  In the past 6 months her cycles have been regular, predictable and occur monthly.  Her menstrual flow is typically normal.   Each month on average there are roughly 0 day(s) of very heavy flow.  Intermenstrual bleeding is absent.    Post-coital bleeding is absent.  Dysmenorrhea: is not affecting her activities of daily living  PMS: is not affecting her activities of daily living  Her cycles are not a source of concern for her that she wishes to discuss today.  HEALTH Hx:  She exercises regularly: no (and has no plans to become more active).  She wears her seat belt: yes.  She has  concerns about domestic violence: no.  OTHER THINGS SHE WANTS TO DISCUSS TODAY:  Nothing else    The following portions of the patient's history were reviewed and updated as appropriate:problem list, current medications, allergies, past family history, past medical history, past social history and past surgical history.    Social History    Tobacco Use      Smoking status: Never Smoker      Smokeless tobacco: Never Used    Review of Systems  Constitutional POS: nothing reported    NEG: anorexia or night sweats   Genitourinary POS: see HPI    NEG: dysuria or hematuria      Gastointestinal POS: see HPI    NEG: change in bowel habits, melena or reflux symptoms   Integument POS: nothing reported    NEG: moles that are changing in size, shape, color or rashes   Breast POS: nothing reported    NEG: persistent breast lump, skin dimpling or nipple discharge        Objective   /74   Resp 14   Wt 73.5 kg (162 lb)   LMP 10/15/2021 (Approximate)   Breastfeeding No   BMI 26.15 kg/m²     General:  well developed; well nourished  no acute distress   Skin:  No suspicious lesions seen   Thyroid: normal to inspection and palpation   Breasts:  Examined in supine position  Symmetric without masses or skin dimpling  Nipples normal without inversion, lesions or discharge  There are no palpable axillary nodes   Abdomen: soft, non-tender; no masses  no umbilical or inguinal hernias are present  no hepato-splenomegaly   Pelvis: Clinical staff was present for exam  External genitalia:  normal appearance of the external genitalia including [place]th[place]'s and [place]'s glands.  :  urethral meatus normal;  Vaginal:  normal pink mucosa without prolapse or lesions.  Cervix:  normal appearance.  Uterus:  normal size, shape and consistency. retroverted;  Adnexa:  normal bimanual exam of the adnexa.  Rectal:  digital rectal exam not performed; anus visually normal appearing.        Assessment   1. Normal GYN exam  2. HARRIET - not significantly  affecting her activities of daily living.  3. Loose stools with history of both large and small bowel obstruction historically.  Following up with general surgery and gastroenterology  4. She is up to date on all relevant gynecologic and colorectal screenings except mammography     Plan   1. Pap was not done today.  I explained to Paula that the recommendations for Pap smear interval in a low risk patient has lengthened to 3 years time.  I told Paula she still needs to be seen in our office yearly for a full physical including breast and pelvic exam.  2. She was encouraged to get yearly mammograms.  She should report any palpable breast lump(s) or skin changes regardless of mammographic findings.  I explained to Paula that notification regarding her mammogram results will come from the center performing the study.  Our office will not be routinely calling with mammogram results.  It is her responsibility to make sure that the results from the mammogram are communicated to her by the breast center.  If she has any questions about the results, she is welcome to call our office anytime.  3. I discussed with Paula that she may be behind on needed vaccinations for Influenza.  She may be able to obtain these vaccinations at her local pharmacy OR speak about obtaining them with her primary care.  If she does obtain her vaccines, I have asked Paula to let us know the date each vaccine was obtained so that her medical record could be updated in our system.  4. The importance of keeping all planned follow-up and taking all medications as prescribed was emphasized.  5. Follow up for annual exam 1 year           This note was electronically signed.    Kerwin Paez M.D.  November 2, 2021    Note: Speech recognition transcription software may have been used to create portions of this document.  An attempt at proofreading has been made but errors in transcription could still be present.

## 2021-11-03 ENCOUNTER — HOSPITAL ENCOUNTER (OUTPATIENT)
Dept: CT IMAGING | Facility: HOSPITAL | Age: 46
Discharge: HOME | End: 2021-11-03
Admitting: INTERNAL MEDICINE

## 2021-11-03 DIAGNOSIS — K56.600 PARTIAL INTESTINAL OBSTRUCTION, UNSPECIFIED CAUSE (HCC): ICD-10-CM

## 2021-11-03 PROCEDURE — 0 IOPAMIDOL PER 1 ML: Performed by: INTERNAL MEDICINE

## 2021-11-03 PROCEDURE — 74177 CT ABD & PELVIS W/CONTRAST: CPT

## 2021-11-03 RX ADMIN — IOPAMIDOL 125 ML: 755 INJECTION, SOLUTION INTRAVENOUS at 10:27

## 2021-11-16 ENCOUNTER — OFFICE VISIT (OUTPATIENT)
Dept: GASTROENTEROLOGY | Facility: CLINIC | Age: 46
End: 2021-11-16

## 2021-11-16 VITALS
BODY MASS INDEX: 26.39 KG/M2 | DIASTOLIC BLOOD PRESSURE: 82 MMHG | WEIGHT: 164.2 LBS | HEART RATE: 89 BPM | HEIGHT: 66 IN | OXYGEN SATURATION: 96 % | SYSTOLIC BLOOD PRESSURE: 122 MMHG | TEMPERATURE: 97.3 F

## 2021-11-16 DIAGNOSIS — K59.00 CONSTIPATION, UNSPECIFIED CONSTIPATION TYPE: Primary | ICD-10-CM

## 2021-11-16 PROCEDURE — 99214 OFFICE O/P EST MOD 30 MIN: CPT | Performed by: INTERNAL MEDICINE

## 2021-11-16 RX ORDER — MAGNESIUM OXIDE 400 MG/1
400 TABLET ORAL DAILY
Qty: 90 TABLET | Refills: 3 | Status: SHIPPED | OUTPATIENT
Start: 2021-11-16

## 2021-11-16 RX ORDER — POLYETHYLENE GLYCOL 3350 17 G/17G
17 POWDER, FOR SOLUTION ORAL DAILY
Qty: 100 PACKET | Refills: 3 | Status: SHIPPED | OUTPATIENT
Start: 2021-11-16

## 2021-11-16 NOTE — PROGRESS NOTES
PCP: Paula Espinoza APRN    Chief Complaint   Patient presents with   • Follow-up     partial intestinal obstruction, constipation        History of Present Illness:   Paula Quispe is a 46 y.o. female who presents to GI clinic as a follow up for psbo. To recall: followed with Dr. Kelly at , possible crohn's disease management, followed with Dr. Harkins, no crohn's disease management. Had a sigmoid colectomy for bowel obstruction with mucous fistula  by dr. Hughes.    Previously managed with psbo with diet therapy. On no crohn's management. She is having infrequent bms after previously having 5-7 loose bms.  Infrequent flatus. No gib loss.     Past Medical History:   Diagnosis Date   • Chronic constipation     Resolved after partial colectomy   • COVID 2021   • Endometriosis    • Large bowel obstruction (HCC) 2016   • Small bowel obstruction (HCC) 2021    Admitted in [place], [place]       Past Surgical History:   Procedure Laterality Date   •  SECTION     • COLONOSCOPY     • DIAGNOSTIC LAPAROSCOPY      Dx with endometriosis   • ILEOSTOMY CLOSURE  2016    Dr. Hughes - Takedown   • LUMBAR DISCECTOMY     • SUBTOTAL COLECTOMY  2016    Dr. Hughes - Sigmoid colectomy for bowel obstruction with mucous fistula creastion   • TONSILLECTOMY         No current outpatient medications on file.    Allergies   Allergen Reactions   • Phenergan [Promethazine Hcl] Hives       Family History   Problem Relation Age of Onset   • Cervical cancer Mother    • Breast cancer Maternal Aunt 70   • Breast cancer Maternal Aunt 80   • Ovarian cancer Neg Hx    • Colon cancer Neg Hx    • Colon polyps Neg Hx    • Esophageal cancer Neg Hx        Social History     Socioeconomic History   • Marital status:    Tobacco Use   • Smoking status: Never Smoker   • Smokeless tobacco: Never Used   Vaping Use   • Vaping Use: Never used   Substance and Sexual Activity   • Alcohol use: No   •  Drug use: No       Review of Systems  A complete 12 point ros was asked and is negative except for that mentioned above.  In particular:  No fever  No rash  No increased arthralgias  No worsening edema  No cough  No dyspnea  No chest pain      Vitals:    11/16/21 1303   BP: 122/82   Pulse: 89   Temp: 97.3 °F (36.3 °C)   SpO2: 96%       Physical Exam  General: well developed, well nourished  A+O x 3 NAD  HEENT: NCAT, pupils equal appearing, sclera appear white  NECK: full ROM  Respiratory: symmetric chest rise, normal effort, normal work of breathing, no overt rales  Abomen: non-distended  Skin: normal color, no jaundice  Neuro: no tremor, no facial droop  Psych: normal mood and affect      Assessment/Plan  1.) History of colon resection  2.) Apthous ulcers at surgical anastamosis  3.) History of benign abdominal mass, inflammatory  4.) history of poorly healing ileostomy wound   5.) Chronic diarrhea with loss of icv  6.) Recurrent psbo, last 1 month ago    Overall clinical picture appears as though crohn's disease is a possibility. Either a minor crohn's combined with adhesive scar or a stricturing/perforation phenotype.  She takes nsaids infrequently. Does not recall any previous medication for crohn's disease    - CTE: no abnormality at anastamosis; increased stool burden in distal colon  - cbc, cmp, crp all normal  - due to no overt obstruction, will give bowel prep and start miralax and mag oxide bid   - if no improvement, egd/colonoscopy    7.) Anemia  Pending response above, will proceed to either egd or egd/colonoscopy      Moises Page MD  11/16/2021

## 2022-02-02 ENCOUNTER — OFFICE VISIT (OUTPATIENT)
Dept: GASTROENTEROLOGY | Facility: CLINIC | Age: 47
End: 2022-02-02

## 2022-02-02 VITALS
OXYGEN SATURATION: 99 % | TEMPERATURE: 97.1 F | HEART RATE: 74 BPM | DIASTOLIC BLOOD PRESSURE: 76 MMHG | WEIGHT: 163.2 LBS | SYSTOLIC BLOOD PRESSURE: 124 MMHG | BODY MASS INDEX: 26.23 KG/M2 | HEIGHT: 66 IN

## 2022-02-02 DIAGNOSIS — K50.019 CROHN'S DISEASE OF SMALL INTESTINE WITH COMPLICATION: Primary | ICD-10-CM

## 2022-02-02 PROCEDURE — 99214 OFFICE O/P EST MOD 30 MIN: CPT | Performed by: INTERNAL MEDICINE

## 2022-02-02 NOTE — PROGRESS NOTES
PCP: Paula Espinoza APRN    Chief Complaint   Patient presents with   • Follow-up     2 mo follow up on Constipation       History of Present Illness:   Paula Quispe is a 47 y.o. female who presents to GI clinic as a follow up for suspected crohn's disease.  H/o recent psbo 2021 (osh) in light of no stricture seen on CTE 21.  States abdominal pain continous, loose bowels some days. Some days does not pass bm.     To recall: followed with Dr. Kelly at , possible crohn's disease management, followed with Dr. Harkins, no crohn's disease management. Had a sigmoid colectomy for bowel obstruction with mucous fistula  by dr. Hughes.    Previously managed with psbo with diet therapy. On no crohn's management. .      Past Medical History:   Diagnosis Date   • Chronic constipation     Resolved after partial colectomy   • COVID 2021   • Endometriosis    • Large bowel obstruction (HCC) 2016   • Small bowel obstruction (HCC) 2021    Admitted in [place], [place]       Past Surgical History:   Procedure Laterality Date   •  SECTION     • COLONOSCOPY     • DIAGNOSTIC LAPAROSCOPY      Dx with endometriosis   • ILEOSTOMY CLOSURE  2016    Dr. Hughes - Takedown   • LUMBAR DISCECTOMY     • SUBTOTAL COLECTOMY  2016    Dr. Hughes - Sigmoid colectomy for bowel obstruction with mucous fistula creastion   • TONSILLECTOMY           Current Outpatient Medications:   •  magnesium oxide (MAG-OX) 400 MG tablet, Take 1 tablet by mouth Daily., Disp: 90 tablet, Rfl: 3  •  polyethylene glycol (MIRALAX) 17 g packet, Take 17 g by mouth Daily., Disp: 100 packet, Rfl: 3    Allergies   Allergen Reactions   • Phenergan [Promethazine Hcl] Hives       Family History   Problem Relation Age of Onset   • Cervical cancer Mother    • Breast cancer Maternal Aunt 70   • Breast cancer Maternal Aunt 80   • Ovarian cancer Neg Hx    • Colon cancer Neg Hx    • Colon polyps Neg Hx    • Esophageal  cancer Neg Hx        Social History     Socioeconomic History   • Marital status:    Tobacco Use   • Smoking status: Never Smoker   • Smokeless tobacco: Never Used   Vaping Use   • Vaping Use: Never used   Substance and Sexual Activity   • Alcohol use: No   • Drug use: No       Review of Systems  A complete 12 point ros was asked and is negative except for that mentioned above.  In particular:  No fever  No rash  No increased arthralgias      1.) Suspect crohn's disease, History of colon resection, 2016  2.) Apthous ulcers at surgical anastamosis  3.) History of benign abdominal mass, inflammatory, granulomatous  4.) history of poorly healing ileostomy wound   5.) Chronic diarrhea with loss of icv  6.) Recurrent psbo, last 8/2021    Suspect crohn's disease +/- component of IBS    - CTE 9/2021: no abnormality at anastamosis; increased stool burden in distal colon  - psbo [facility] 8/2021 at [facility]  - cbc, cmp, crp all normal  - due to no overt obstruction, bowel prep given and no improvement in symptoms  - overall constellation of signs and symptoms is crohn's disease.  Due to 3 apthous ulcers at anastamosis and continued discomfort with h/o psbo 8/2021, will start entyvio.      7.) Anemia  Will consider egd and repeat colonoscopy pending response to entyvio      No worsening edema  No cough  No dyspnea  No chest pain      Vitals:    02/02/22 1523   BP: 124/76   Pulse: 74   Temp: 97.1 °F (36.2 °C)   SpO2: 99%       Physical Exam  General: well developed, well nourished  A+O x 3 NAD  HEENT: NCAT, pupils equal appearing, sclera appear white  NECK: full ROM  Respiratory: symmetric chest rise, normal effort, normal work of breathing, no overt rales  Abomen: non-distended  Skin: normal color, no jaundice  Neuro: no tremor, no facial droop  Psych: normal mood and affect      Assessment/Plan        Moises Page MD  2/2/2022

## 2022-02-11 PROBLEM — K50.019 CROHN'S DISEASE OF SMALL INTESTINE WITH COMPLICATION (HCC): Status: ACTIVE | Noted: 2022-02-11

## 2022-02-11 RX ORDER — ACETAMINOPHEN 325 MG/1
650 TABLET ORAL ONCE
OUTPATIENT
Start: 2022-02-14 | End: 2022-02-14

## 2022-02-11 RX ORDER — DIPHENHYDRAMINE HCL 25 MG
25 CAPSULE ORAL ONCE
OUTPATIENT
Start: 2022-02-14 | End: 2022-02-14

## 2022-02-14 ENCOUNTER — APPOINTMENT (OUTPATIENT)
Dept: ONCOLOGY | Facility: HOSPITAL | Age: 47
End: 2022-02-14

## 2022-03-28 ENCOUNTER — APPOINTMENT (OUTPATIENT)
Dept: ONCOLOGY | Facility: HOSPITAL | Age: 47
End: 2022-03-28

## 2022-05-05 ENCOUNTER — OFFICE VISIT (OUTPATIENT)
Dept: GASTROENTEROLOGY | Facility: CLINIC | Age: 47
End: 2022-05-05

## 2022-05-05 ENCOUNTER — LAB (OUTPATIENT)
Dept: LAB | Facility: HOSPITAL | Age: 47
End: 2022-05-05

## 2022-05-05 VITALS
HEIGHT: 66 IN | BODY MASS INDEX: 27 KG/M2 | OXYGEN SATURATION: 98 % | SYSTOLIC BLOOD PRESSURE: 116 MMHG | DIASTOLIC BLOOD PRESSURE: 80 MMHG | TEMPERATURE: 97.5 F | WEIGHT: 168 LBS | HEART RATE: 86 BPM

## 2022-05-05 DIAGNOSIS — K50.019 CROHN'S DISEASE OF SMALL INTESTINE WITH COMPLICATION: Primary | ICD-10-CM

## 2022-05-05 DIAGNOSIS — K50.019 CROHN'S DISEASE OF SMALL INTESTINE WITH COMPLICATION: ICD-10-CM

## 2022-05-05 LAB
ALBUMIN SERPL-MCNC: 4.2 G/DL (ref 3.5–5.2)
ALBUMIN/GLOB SERPL: 1.2 G/DL
ALP SERPL-CCNC: 94 U/L (ref 39–117)
ALT SERPL W P-5'-P-CCNC: 33 U/L (ref 1–33)
ANION GAP SERPL CALCULATED.3IONS-SCNC: 9.9 MMOL/L (ref 5–15)
AST SERPL-CCNC: 30 U/L (ref 1–32)
BILIRUB SERPL-MCNC: 0.3 MG/DL (ref 0–1.2)
BUN SERPL-MCNC: 13 MG/DL (ref 6–20)
BUN/CREAT SERPL: 14.9 (ref 7–25)
CALCIUM SPEC-SCNC: 9.1 MG/DL (ref 8.6–10.5)
CHLORIDE SERPL-SCNC: 102 MMOL/L (ref 98–107)
CO2 SERPL-SCNC: 26.1 MMOL/L (ref 22–29)
CREAT SERPL-MCNC: 0.87 MG/DL (ref 0.57–1)
CRP SERPL-MCNC: <0.3 MG/DL (ref 0–0.5)
DEPRECATED RDW RBC AUTO: 39.2 FL (ref 37–54)
EGFRCR SERPLBLD CKD-EPI 2021: 82.8 ML/MIN/1.73
ERYTHROCYTE [DISTWIDTH] IN BLOOD BY AUTOMATED COUNT: 13.1 % (ref 12.3–15.4)
GLOBULIN UR ELPH-MCNC: 3.5 GM/DL
GLUCOSE SERPL-MCNC: 78 MG/DL (ref 65–99)
HCT VFR BLD AUTO: 33.8 % (ref 34–46.6)
HGB BLD-MCNC: 11.4 G/DL (ref 12–15.9)
MCH RBC QN AUTO: 28.1 PG (ref 26.6–33)
MCHC RBC AUTO-ENTMCNC: 33.7 G/DL (ref 31.5–35.7)
MCV RBC AUTO: 83.3 FL (ref 79–97)
PLATELET # BLD AUTO: 407 10*3/MM3 (ref 140–450)
PMV BLD AUTO: 9.3 FL (ref 6–12)
POTASSIUM SERPL-SCNC: 4.1 MMOL/L (ref 3.5–5.2)
PROT SERPL-MCNC: 7.7 G/DL (ref 6–8.5)
RBC # BLD AUTO: 4.06 10*6/MM3 (ref 3.77–5.28)
SODIUM SERPL-SCNC: 138 MMOL/L (ref 136–145)
WBC NRBC COR # BLD: 5.99 10*3/MM3 (ref 3.4–10.8)

## 2022-05-05 PROCEDURE — 99214 OFFICE O/P EST MOD 30 MIN: CPT | Performed by: INTERNAL MEDICINE

## 2022-05-05 PROCEDURE — 86140 C-REACTIVE PROTEIN: CPT

## 2022-05-05 PROCEDURE — 85027 COMPLETE CBC AUTOMATED: CPT

## 2022-05-05 PROCEDURE — 80053 COMPREHEN METABOLIC PANEL: CPT

## 2022-05-05 RX ORDER — LANOLIN ALCOHOL/MO/W.PET/CERES
1 CREAM (GRAM) TOPICAL DAILY
COMMUNITY
Start: 2022-05-02

## 2022-05-05 NOTE — PROGRESS NOTES
PCP: Paula Espinoza APRN    Chief Complaint   Patient presents with   • Follow-up     Crohns        History of Present Illness:   Paula Quispe is a 47 y.o. female who presents to GI clinic as a follow up for crohn's. Has received 4 infusions of entyvio.  Reverting back to constipation. Has had many years of constipation. Strains to have bm on miralax. No gib loss. No fever. Feeling well overall.     Past Medical History:   Diagnosis Date   • Chronic constipation     Resolved after partial colectomy   • COVID 2021   • Crohn's disease (HCC)    • Endometriosis    • Large bowel obstruction (HCC) 2016   • Small bowel obstruction (HCC) 2021    Admitted in [place], [place]       Past Surgical History:   Procedure Laterality Date   •  SECTION     • COLONOSCOPY     • DIAGNOSTIC LAPAROSCOPY      Dx with endometriosis   • ILEOSTOMY CLOSURE  2016    Dr. Hughes - Takedown   • LUMBAR DISCECTOMY     • SUBTOTAL COLECTOMY  2016    Dr. Hughes - Sigmoid colectomy for bowel obstruction with mucous fistula creastion   • TONSILLECTOMY           Current Outpatient Medications:   •  Cetirizine-Pseudoephedrine (ZYRTEC-D PO), Every 12 (Twelve) Hours., Disp: , Rfl:   •  magnesium oxide (MAG-OX) 400 MG tablet, Take 1 tablet by mouth Daily., Disp: 90 tablet, Rfl: 3  •  Magnesium Oxide 400 (240 Mg) MG tablet, Take 1 tablet by mouth Daily., Disp: , Rfl:   •  polyethylene glycol (MIRALAX) 17 g packet, Take 17 g by mouth Daily., Disp: 100 packet, Rfl: 3    Allergies   Allergen Reactions   • Phenergan [Promethazine Hcl] Hives       Family History   Problem Relation Age of Onset   • Cervical cancer Mother    • Breast cancer Maternal Aunt 70   • Breast cancer Maternal Aunt 80   • Ovarian cancer Neg Hx    • Colon cancer Neg Hx    • Colon polyps Neg Hx    • Esophageal cancer Neg Hx        Social History     Socioeconomic History   • Marital status:    Tobacco Use   • Smoking status: Never  Smoker   • Smokeless tobacco: Never Used   Vaping Use   • Vaping Use: Never used   Substance and Sexual Activity   • Alcohol use: No   • Drug use: No       Review of Systems  A complete 12 point ros was asked and is negative except for that mentioned above.  In particular:  No fever  No rash  No increased arthralgias  No worsening edema  No cough  No dyspnea  No chest pain      Vitals:    05/05/22 1452   BP: 116/80   Pulse: 86   Temp: 97.5 °F (36.4 °C)   SpO2: 98%       Physical Exam  General: well developed, well nourished  A+O x 3 NAD  HEENT: NCAT, pupils equal appearing, sclera appear white  NECK: full ROM  Respiratory: symmetric chest rise, normal effort, normal work of breathing, no overt rales  Abdomen: non-distended  Skin: normal color, no jaundice  Neuro: no tremor, no facial droop  Psych: normal mood and affect      Assessment/Plan  1.) Suspect crohn's disease, History of colon resection, 2016  2.) Apthous ulcers at surgical anastamosis  3.) History of benign abdominal mass, inflammatory, granulomatous  4.) history of poorly healing ileostomy wound   5.) Chronic diarrhea with loss of icv  6.) Recurrent psbo, last 8/2021    Suspect crohn's disease +/- component of IBS-C  Background:  - CTE 9/2021: no abnormality at anastamosis; increased stool burden in distal colon  - psbo management 8/2021 at [facility]  - cbc, cmp, crp all normal  - due to no overt obstruction, bowel prep given and no improvement in symptoms  - overall constellation of signs and symptoms is crohn's disease.  Due to 3 apthous ulcers at anastamosis and continued discomfort with h/o psbo 8/2021, entyvio was started      Plan:  Cbc, cmp, crp    7.) Anemia  Will consider egd and repeat colonoscopy pending response to entyvio, labs pending    8) medication management  cmp              Moises Page MD  5/5/2022

## 2022-05-06 ENCOUNTER — TELEPHONE (OUTPATIENT)
Dept: GASTROENTEROLOGY | Facility: CLINIC | Age: 47
End: 2022-05-06

## 2022-05-06 NOTE — TELEPHONE ENCOUNTER
----- Message from Moises Page MD sent at 5/6/2022  3:15 PM EDT -----  Labs show improving anemia with hemoglobin of 11.4 and no sign of active inflammation which is good news.

## 2022-09-07 ENCOUNTER — OFFICE VISIT (OUTPATIENT)
Dept: GASTROENTEROLOGY | Facility: CLINIC | Age: 47
End: 2022-09-07

## 2022-09-07 ENCOUNTER — LAB (OUTPATIENT)
Dept: LAB | Facility: HOSPITAL | Age: 47
End: 2022-09-07

## 2022-09-07 VITALS
SYSTOLIC BLOOD PRESSURE: 118 MMHG | HEIGHT: 66 IN | OXYGEN SATURATION: 96 % | TEMPERATURE: 97.8 F | WEIGHT: 168.2 LBS | BODY MASS INDEX: 27.03 KG/M2 | HEART RATE: 68 BPM | DIASTOLIC BLOOD PRESSURE: 78 MMHG

## 2022-09-07 DIAGNOSIS — K50.019 CROHN'S DISEASE OF SMALL INTESTINE WITH COMPLICATION: ICD-10-CM

## 2022-09-07 DIAGNOSIS — K50.019 CROHN'S DISEASE OF SMALL INTESTINE WITH COMPLICATION: Primary | ICD-10-CM

## 2022-09-07 LAB
DEPRECATED RDW RBC AUTO: 39.5 FL (ref 37–54)
ERYTHROCYTE [DISTWIDTH] IN BLOOD BY AUTOMATED COUNT: 12.9 % (ref 12.3–15.4)
HCT VFR BLD AUTO: 32.8 % (ref 34–46.6)
HGB BLD-MCNC: 11.1 G/DL (ref 12–15.9)
MCH RBC QN AUTO: 28.1 PG (ref 26.6–33)
MCHC RBC AUTO-ENTMCNC: 33.8 G/DL (ref 31.5–35.7)
MCV RBC AUTO: 83 FL (ref 79–97)
PLATELET # BLD AUTO: 299 10*3/MM3 (ref 140–450)
PMV BLD AUTO: 9.4 FL (ref 6–12)
RBC # BLD AUTO: 3.95 10*6/MM3 (ref 3.77–5.28)
WBC NRBC COR # BLD: 5.72 10*3/MM3 (ref 3.4–10.8)

## 2022-09-07 PROCEDURE — 80053 COMPREHEN METABOLIC PANEL: CPT

## 2022-09-07 PROCEDURE — 86140 C-REACTIVE PROTEIN: CPT

## 2022-09-07 PROCEDURE — 99214 OFFICE O/P EST MOD 30 MIN: CPT | Performed by: INTERNAL MEDICINE

## 2022-09-07 PROCEDURE — 85027 COMPLETE CBC AUTOMATED: CPT

## 2022-09-07 NOTE — PROGRESS NOTES
PCP: Paula Espinoza APRN    Chief Complaint   Patient presents with   • Follow-up     3 mo follow up on Crohn's       History of Present Illness:   Paula uQispe is a 47 y.o. female who presents to GI clinic as a follow up for now on entyvio. No fever. Has had many years of constipation. Strains to have bm . No gib loss. No fever. Feeling well overall.  Back to in person  teaching.  Son plays football for school affected by flooding.     Past Medical History:   Diagnosis Date   • Chronic constipation     Resolved after partial colectomy   • COVID 2021   • Crohn's disease (HCC)    • Endometriosis    • Large bowel obstruction (HCC) 2016   • Small bowel obstruction (HCC) 2021    Admitted in [place], [place]       Past Surgical History:   Procedure Laterality Date   •  SECTION     • COLONOSCOPY     • DIAGNOSTIC LAPAROSCOPY      Dx with endometriosis   • ILEOSTOMY CLOSURE  2016    Dr. Hughes - Takedown   • LUMBAR DISCECTOMY     • SUBTOTAL COLECTOMY  2016    Dr. Hughes - Sigmoid colectomy for bowel obstruction with mucous fistula creastion   • TONSILLECTOMY           Current Outpatient Medications:   •  Cetirizine-Pseudoephedrine (ZYRTEC-D PO), Every 12 (Twelve) Hours., Disp: , Rfl:   •  magnesium oxide (MAG-OX) 400 MG tablet, Take 1 tablet by mouth Daily., Disp: 90 tablet, Rfl: 3  •  Magnesium Oxide 400 (240 Mg) MG tablet, Take 1 tablet by mouth Daily., Disp: , Rfl:   •  polyethylene glycol (MIRALAX) 17 g packet, Take 17 g by mouth Daily., Disp: 100 packet, Rfl: 3    Allergies   Allergen Reactions   • Phenergan [Promethazine Hcl] Hives       Family History   Problem Relation Age of Onset   • Cervical cancer Mother    • Breast cancer Maternal Aunt 70   • Breast cancer Maternal Aunt 80   • Ovarian cancer Neg Hx    • Colon cancer Neg Hx    • Colon polyps Neg Hx    • Esophageal cancer Neg Hx        Social History     Socioeconomic History   • Marital status:     Tobacco Use   • Smoking status: Never Smoker   • Smokeless tobacco: Never Used   Vaping Use   • Vaping Use: Never used   Substance and Sexual Activity   • Alcohol use: No   • Drug use: No       Review of Systems  A complete 12 point ros was asked and is negative except for that mentioned above.  In particular:  No fever  No rash  No increased arthralgias  No worsening edema  No cough  No dyspnea  No chest pain      Vitals:    09/07/22 1454   BP: 118/78   Pulse: 68   Temp: 97.8 °F (36.6 °C)   SpO2: 96%       Physical Exam  General: well developed, well nourished  A+O x 3 NAD  HEENT: NCAT, pupils equal appearing, sclera appear white  NECK: full ROM  Respiratory: symmetric chest rise, normal effort, normal work of breathing, no overt rales  Abdomen: non-distended  Skin: normal color, no jaundice  Neuro: no tremor, no facial droop  Psych: normal mood and affect      Assessment/Plan  1.) Crohn's disease History of colon resection, 2016  2.) Apthous ulcers at surgical anastamosis  3.) History of benign abdominal mass, inflammatory, granulomatous  4.) history of poorly healing ileostomy wound   5.) Chronic diarrhea with loss of icv  6.) Recurrent psbo, last 8/2021    Suspect crohn's disease +/- component of IBS-C  Background:  - colonoscopy: fair prep, 3 diminutive apthous ulcerations at anastamosis  - benign inflammatory mass resection  - CTE 9/2021: no abnormality at anastamosis; increased stool burden in distal colon  - psbo management [facility]/2021 at [facility]  - due to no overt obstruction, bowel prep given and no improvement in symptoms  - overall constellation of signs and symptoms is crohn's disease.  Due to 3 apthous ulcers at anastamosis and continued discomfort with h/o psbo 8/2021, entyvio was started      Plan:  Cbc, cmp, crp  Continue entyvio    7.) Anemia  Will consider egd and repeat colonoscopy pending response to entyvio, labs pending    8) medication management  cmp    9.) IBS-C  Start linzess 145, no sign  of psbo, previous constipation issues preceding crohn's diagnosis    Moises Page MD  9/7/2022

## 2022-09-08 LAB
ALBUMIN SERPL-MCNC: 4 G/DL (ref 3.5–5.2)
ALBUMIN/GLOB SERPL: 1.4 G/DL
ALP SERPL-CCNC: 77 U/L (ref 39–117)
ALT SERPL W P-5'-P-CCNC: 32 U/L (ref 1–33)
ANION GAP SERPL CALCULATED.3IONS-SCNC: 8.3 MMOL/L (ref 5–15)
AST SERPL-CCNC: 32 U/L (ref 1–32)
BILIRUB SERPL-MCNC: 0.3 MG/DL (ref 0–1.2)
BUN SERPL-MCNC: 12 MG/DL (ref 6–20)
BUN/CREAT SERPL: 19.7 (ref 7–25)
CALCIUM SPEC-SCNC: 8.9 MG/DL (ref 8.6–10.5)
CHLORIDE SERPL-SCNC: 104 MMOL/L (ref 98–107)
CO2 SERPL-SCNC: 26.7 MMOL/L (ref 22–29)
CREAT SERPL-MCNC: 0.61 MG/DL (ref 0.57–1)
CRP SERPL-MCNC: <0.3 MG/DL (ref 0–0.5)
EGFRCR SERPLBLD CKD-EPI 2021: 111.1 ML/MIN/1.73
GLOBULIN UR ELPH-MCNC: 2.8 GM/DL
GLUCOSE SERPL-MCNC: 76 MG/DL (ref 65–99)
POTASSIUM SERPL-SCNC: 4.2 MMOL/L (ref 3.5–5.2)
PROT SERPL-MCNC: 6.8 G/DL (ref 6–8.5)
SODIUM SERPL-SCNC: 139 MMOL/L (ref 136–145)

## 2022-10-04 ENCOUNTER — CONSULT (OUTPATIENT)
Dept: ONCOLOGY | Facility: CLINIC | Age: 47
End: 2022-10-04

## 2022-10-04 ENCOUNTER — LAB (OUTPATIENT)
Dept: LAB | Facility: HOSPITAL | Age: 47
End: 2022-10-04

## 2022-10-04 VITALS
TEMPERATURE: 97.7 F | OXYGEN SATURATION: 100 % | WEIGHT: 171 LBS | RESPIRATION RATE: 18 BRPM | HEART RATE: 79 BPM | BODY MASS INDEX: 27.48 KG/M2 | DIASTOLIC BLOOD PRESSURE: 84 MMHG | HEIGHT: 66 IN | SYSTOLIC BLOOD PRESSURE: 149 MMHG

## 2022-10-04 DIAGNOSIS — D50.8 OTHER IRON DEFICIENCY ANEMIA: Primary | ICD-10-CM

## 2022-10-04 DIAGNOSIS — D50.8 OTHER IRON DEFICIENCY ANEMIA: ICD-10-CM

## 2022-10-04 PROBLEM — D50.9 IRON DEFICIENCY ANEMIA: Status: ACTIVE | Noted: 2022-10-04

## 2022-10-04 LAB
ALBUMIN SERPL-MCNC: 4.3 G/DL (ref 3.5–5.2)
ALBUMIN/GLOB SERPL: 1.4 G/DL
ALP SERPL-CCNC: 77 U/L (ref 39–117)
ALT SERPL W P-5'-P-CCNC: 30 U/L (ref 1–33)
ANION GAP SERPL CALCULATED.3IONS-SCNC: 12 MMOL/L (ref 5–15)
AST SERPL-CCNC: 26 U/L (ref 1–32)
BASOPHILS # BLD AUTO: 0.03 10*3/MM3 (ref 0–0.2)
BASOPHILS NFR BLD AUTO: 0.5 % (ref 0–1.5)
BILIRUB SERPL-MCNC: 0.3 MG/DL (ref 0–1.2)
BUN SERPL-MCNC: 12 MG/DL (ref 6–20)
BUN/CREAT SERPL: 20.7 (ref 7–25)
CALCIUM SPEC-SCNC: 9.2 MG/DL (ref 8.6–10.5)
CHLORIDE SERPL-SCNC: 104 MMOL/L (ref 98–107)
CO2 SERPL-SCNC: 24 MMOL/L (ref 22–29)
CREAT SERPL-MCNC: 0.58 MG/DL (ref 0.57–1)
DEPRECATED RDW RBC AUTO: 41 FL (ref 37–54)
EGFRCR SERPLBLD CKD-EPI 2021: 112.5 ML/MIN/1.73
EOSINOPHIL # BLD AUTO: 0.08 10*3/MM3 (ref 0–0.4)
EOSINOPHIL NFR BLD AUTO: 1.3 % (ref 0.3–6.2)
ERYTHROCYTE [DISTWIDTH] IN BLOOD BY AUTOMATED COUNT: 12.8 % (ref 12.3–15.4)
FERRITIN SERPL-MCNC: 6.39 NG/ML (ref 13–150)
GLOBULIN UR ELPH-MCNC: 3.1 GM/DL
GLUCOSE SERPL-MCNC: 79 MG/DL (ref 65–99)
HCT VFR BLD AUTO: 36.8 % (ref 34–46.6)
HGB BLD-MCNC: 12.2 G/DL (ref 12–15.9)
IMM GRANULOCYTES # BLD AUTO: 0.02 10*3/MM3 (ref 0–0.05)
IMM GRANULOCYTES NFR BLD AUTO: 0.3 % (ref 0–0.5)
IRON 24H UR-MRATE: 42 MCG/DL (ref 37–145)
IRON SATN MFR SERPL: 7 % (ref 20–50)
LYMPHOCYTES # BLD AUTO: 1.28 10*3/MM3 (ref 0.7–3.1)
LYMPHOCYTES NFR BLD AUTO: 20.5 % (ref 19.6–45.3)
MCH RBC QN AUTO: 29 PG (ref 26.6–33)
MCHC RBC AUTO-ENTMCNC: 33.2 G/DL (ref 31.5–35.7)
MCV RBC AUTO: 87.6 FL (ref 79–97)
MONOCYTES # BLD AUTO: 0.52 10*3/MM3 (ref 0.1–0.9)
MONOCYTES NFR BLD AUTO: 8.3 % (ref 5–12)
NEUTROPHILS NFR BLD AUTO: 4.31 10*3/MM3 (ref 1.7–7)
NEUTROPHILS NFR BLD AUTO: 69.1 % (ref 42.7–76)
NRBC BLD AUTO-RTO: 0 /100 WBC (ref 0–0.2)
PLATELET # BLD AUTO: 348 10*3/MM3 (ref 140–450)
PMV BLD AUTO: 9.1 FL (ref 6–12)
POTASSIUM SERPL-SCNC: 3.9 MMOL/L (ref 3.5–5.2)
PROT SERPL-MCNC: 7.4 G/DL (ref 6–8.5)
RBC # BLD AUTO: 4.2 10*6/MM3 (ref 3.77–5.28)
SODIUM SERPL-SCNC: 140 MMOL/L (ref 136–145)
TIBC SERPL-MCNC: 603 MCG/DL (ref 298–536)
TRANSFERRIN SERPL-MCNC: 405 MG/DL (ref 200–360)
WBC NRBC COR # BLD: 6.24 10*3/MM3 (ref 3.4–10.8)

## 2022-10-04 PROCEDURE — 85025 COMPLETE CBC W/AUTO DIFF WBC: CPT

## 2022-10-04 PROCEDURE — 85060 BLOOD SMEAR INTERPRETATION: CPT

## 2022-10-04 PROCEDURE — 82607 VITAMIN B-12: CPT

## 2022-10-04 PROCEDURE — 80053 COMPREHEN METABOLIC PANEL: CPT

## 2022-10-04 PROCEDURE — 84466 ASSAY OF TRANSFERRIN: CPT

## 2022-10-04 PROCEDURE — 82728 ASSAY OF FERRITIN: CPT

## 2022-10-04 PROCEDURE — 36415 COLL VENOUS BLD VENIPUNCTURE: CPT

## 2022-10-04 PROCEDURE — 82746 ASSAY OF FOLIC ACID SERUM: CPT

## 2022-10-04 PROCEDURE — 99204 OFFICE O/P NEW MOD 45 MIN: CPT | Performed by: INTERNAL MEDICINE

## 2022-10-04 PROCEDURE — 83540 ASSAY OF IRON: CPT

## 2022-10-04 NOTE — PROGRESS NOTES
New Patient Office Visit      Date: 10/04/2022     Patient Name: Paula Quispe  MRN: [id number]  : 1975  Referring Physician: Rahul Page    Chief Complaint: Establish care for anemia    History of Present Illness: Paula Quispe is a pleasant 47 y.o. female past medical history of Crohn's who presents today for evaluation of anemia. The patient was recently diagnosed with Crohn's disease and has been on Entyvio for least 4-5 injections.  States that she has been having intermittent abdominal pain and discomfort for almost 30 years prior to her diagnosis.  Has had partial bowel resections with reanastomosis during this timeframe.  She has been monitoring labs which was been notable for hemoglobin around 11-12 over the past several months.  Patient denies any worsening fatigue or tiredness.  Denies any significant cravings for ice.  Denies any easy bleeding or bruising episodes.  Does note some mild restless leg syndrome symptoms at night.  Notes that her daughter has had to be on iron in the past.  She otherwise feels well today and has no major concerns or complaints    Oncology History:    Oncology/Hematology History    No history exists.       Subjective      Review of Systems:     Constitutional: Negative for fevers, chills, or weight loss  Eyes: Negative for blurred vision or discharge         Ear/Nose/Throat: Negative for difficulty swallowing, sore throat, LAD                                                       Respiratory: Negative for cough, SOA, wheezing                                                                                        Cardiovascular: Negative for chest pain or palpitations                                                                  Gastrointestinal: Negative for nausea, vomiting or diarrhea                                                                     Genitourinary: Negative for dysuria or hematuria                                                                                            Musculoskeletal: Negative for any joint pains or muscle aches                                                                        Neurologic: Negative for any weakness, headaches, dizziness                                                                         Hematologic: Negative for any easy bleeding or bruising                                                                                   Psychiatric: Negative for anxiety or depression                             Past Medical History:   Past Medical History:   Diagnosis Date   • Chronic constipation     Resolved after partial colectomy   • COVID 2021   • Crohn's disease (HCC)    • Endometriosis    • Large bowel obstruction (HCC) 2016   • Small bowel obstruction (HCC) 2021    Admitted in [place], [place]       Past Surgical History:   Past Surgical History:   Procedure Laterality Date   •  SECTION     • COLONOSCOPY     • DIAGNOSTIC LAPAROSCOPY      Dx with endometriosis   • ILEOSTOMY CLOSURE  2016    Dr. Saul Maharaj Takesue   • LUMBAR DISCECTOMY     • SUBTOTAL COLECTOMY  2016    Dr. Hughes - Sigmoid colectomy for bowel obstruction with mucous fistula creastion   • TONSILLECTOMY         Family History:   Family History   Problem Relation Age of Onset   • Cervical cancer Mother    • Breast cancer Maternal Aunt 70   • Breast cancer Maternal Aunt 80   • Ovarian cancer Neg Hx    • Colon cancer Neg Hx    • Colon polyps Neg Hx    • Esophageal cancer Neg Hx        Social History:   Social History     Socioeconomic History   • Marital status:    Tobacco Use   • Smoking status: Never Smoker   • Smokeless tobacco: Never Used   Vaping Use   • Vaping Use: Never used   Substance and Sexual Activity   • Alcohol use: No   • Drug use: No       Medications:     Current Outpatient Medications:   •  Cetirizine-Pseudoephedrine (ZYRTEC-D PO), Every 12 (Twelve) Hours., Disp: , Rfl:   •   "linaclotide (Linzess) 145 MCG capsule capsule, Take 1 capsule by mouth 30 minutes before breakfast daily., Disp: 90 capsule, Rfl: 3  •  magnesium oxide (MAG-OX) 400 MG tablet, Take 1 tablet by mouth Daily., Disp: 90 tablet, Rfl: 3  •  Magnesium Oxide 400 (240 Mg) MG tablet, Take 1 tablet by mouth Daily., Disp: , Rfl:   •  polyethylene glycol (MIRALAX) 17 g packet, Take 17 g by mouth Daily., Disp: 100 packet, Rfl: 3    Allergies:   Allergies   Allergen Reactions   • Phenergan [Promethazine Hcl] Hives       Objective     Physical Exam:  Vital Signs:   Vitals:    10/04/22 1345   BP: 149/84   Pulse: 79   Resp: 18   Temp: 97.7 °F (36.5 °C)   SpO2: 100%   Weight: 77.6 kg (171 lb)   Height: 167.6 cm (66\")   PainSc: 0-No pain     Pain Score    10/04/22 1345   PainSc: 0-No pain     ECOG Performance Status: 0 - Asymptomatic    Constitutional: NAD, ECOG 0  Eyes: PERRLA, scleral anicteric  ENT: No LAD, no thyromegaly  Respiratory: CTAB, no wheezing, rales, rhonchi  Cardiovascular: RRR, no murmurs, pulses 2+ bilaterally  Abdomen: soft, NT/ND, no HSM  Musculoskeletal: strength 5/5 bilaterally, no c/c/e  Neurologic: A&O x 3, CN II-XII intact grossly  Psych: mood and affect congruent, no SI or HI    Results Review:   No visits with results within 2 Week(s) from this visit.   Latest known visit with results is:   Lab on 09/07/2022   Component Date Value Ref Range Status   • WBC 09/07/2022 5.72  3.40 - 10.80 10*3/mm3 Final   • RBC 09/07/2022 3.95  3.77 - 5.28 10*6/mm3 Final   • Hemoglobin 09/07/2022 11.1 (A) 12.0 - 15.9 g/dL Final   • Hematocrit 09/07/2022 32.8 (A) 34.0 - 46.6 % Final   • MCV 09/07/2022 83.0  79.0 - 97.0 fL Final   • MCH 09/07/2022 28.1  26.6 - 33.0 pg Final   • MCHC 09/07/2022 33.8  31.5 - 35.7 g/dL Final   • RDW 09/07/2022 12.9  12.3 - 15.4 % Final   • RDW-SD 09/07/2022 39.5  37.0 - 54.0 fl Final   • MPV 09/07/2022 9.4  6.0 - 12.0 fL Final   • Platelets 09/07/2022 299  140 - 450 10*3/mm3 Final   • Glucose " 09/07/2022 76  65 - 99 mg/dL Final   • BUN 09/07/2022 12  6 - 20 mg/dL Final   • Creatinine 09/07/2022 0.61  0.57 - 1.00 mg/dL Final   • Sodium 09/07/2022 139  136 - 145 mmol/L Final   • Potassium 09/07/2022 4.2  3.5 - 5.2 mmol/L Final   • Chloride 09/07/2022 104  98 - 107 mmol/L Final   • CO2 09/07/2022 26.7  22.0 - 29.0 mmol/L Final   • Calcium 09/07/2022 8.9  8.6 - 10.5 mg/dL Final   • Total Protein 09/07/2022 6.8  6.0 - 8.5 g/dL Final   • Albumin 09/07/2022 4.00  3.50 - 5.20 g/dL Final   • ALT (SGPT) 09/07/2022 32  1 - 33 U/L Final   • AST (SGOT) 09/07/2022 32  1 - 32 U/L Final   • Alkaline Phosphatase 09/07/2022 77  39 - 117 U/L Final   • Total Bilirubin 09/07/2022 0.3  0.0 - 1.2 mg/dL Final   • Globulin 09/07/2022 2.8  gm/dL Final   • A/G Ratio 09/07/2022 1.4  g/dL Final   • BUN/Creatinine Ratio 09/07/2022 19.7  7.0 - 25.0 Final   • Anion Gap 09/07/2022 8.3  5.0 - 15.0 mmol/L Final   • eGFR 09/07/2022 111.1  >60.0 mL/min/1.73 Final    National Kidney Foundation and American Society of Nephrology (ASN) Task Force recommended calculation based on the Chronic Kidney Disease Epidemiology Collaboration (CKD-EPI) equation refit without adjustment for race.   • C-Reactive Protein 09/07/2022 <0.30  0.00 - 0.50 mg/dL Final       No results found.    Assessment / Plan      Assessment/Plan:   1. Other iron deficiency anemia (Primary)  -Unclear etiology likely related to malabsorption from her Crohn's disease  -We will check labs today to rule out a secondary causes  -Should she be significantly iron deficient, would consider IV supplementation as she would not tolerate oral iron due to malabsorption from her Crohn's disease  -     CBC & Differential; Future  -     Comprehensive Metabolic Panel; Future  -     Ferritin; Future  -     Iron Profile; Future  -     Folate; Future  -     Vitamin B12; Future  -     Peripheral Blood Smear; Future           Follow Up:   Follow-up in 3 months pending lab results     Reginald TRAVIS  MD [facility]     Please note that portions of this note may have been completed with a voice recognition program. Efforts were made to edit the dictations, but occasionally words are mistranscribed.

## 2022-10-05 ENCOUNTER — TELEPHONE (OUTPATIENT)
Dept: GASTROENTEROLOGY | Facility: CLINIC | Age: 47
End: 2022-10-05

## 2022-10-05 LAB
CYTOLOGIST CVX/VAG CYTO: NORMAL
FOLATE SERPL-MCNC: 12 NG/ML (ref 4.78–24.2)
PATH INTERP BLD-IMP: NORMAL
VIT B12 BLD-MCNC: 253 PG/ML (ref 211–946)

## 2022-11-13 NOTE — TELEPHONE ENCOUNTER
Please fax over a new [employer] order to [facility] [phone]. Auth is good until next year.    Average

## 2023-01-05 ENCOUNTER — TELEPHONE (OUTPATIENT)
Dept: GASTROENTEROLOGY | Facility: CLINIC | Age: 48
End: 2023-01-05
Payer: COMMERCIAL

## 2023-01-05 NOTE — TELEPHONE ENCOUNTER
Michelle Bosch from [facility] called. They are needing Janeth massey and PA.   Fax: [phone]

## 2023-01-09 ENCOUNTER — TELEPHONE (OUTPATIENT)
Dept: ONCOLOGY | Facility: CLINIC | Age: 48
End: 2023-01-09
Payer: COMMERCIAL

## 2023-01-09 NOTE — TELEPHONE ENCOUNTER
Caller: Paula Quispe    Relationship to patient: Self    Best call back number: [phone]    Chief complaint: CALLING TO RESCHEDULE FROM 1/10/23 APPT    Type of visit: FU    Requested date: NEXT AVAILABLE

## 2023-01-24 ENCOUNTER — TELEPHONE (OUTPATIENT)
Dept: ONCOLOGY | Facility: CLINIC | Age: 48
End: 2023-01-24

## 2023-01-24 ENCOUNTER — TELEMEDICINE (OUTPATIENT)
Dept: ONCOLOGY | Facility: CLINIC | Age: 48
End: 2023-01-24
Payer: COMMERCIAL

## 2023-01-24 VITALS — HEIGHT: 66 IN | BODY MASS INDEX: 27.6 KG/M2

## 2023-01-24 DIAGNOSIS — D50.8 OTHER IRON DEFICIENCY ANEMIA: Primary | ICD-10-CM

## 2023-01-24 PROCEDURE — 99214 OFFICE O/P EST MOD 30 MIN: CPT | Performed by: INTERNAL MEDICINE

## 2023-01-24 RX ORDER — FERROUS SULFATE 325(65) MG
325 TABLET ORAL
Qty: 30 TABLET | Refills: 5 | Status: SHIPPED | OUTPATIENT
Start: 2023-01-24

## 2023-01-24 RX ORDER — LISINOPRIL 2.5 MG/1
2.5 TABLET ORAL DAILY
COMMUNITY
Start: 2023-01-13

## 2023-01-24 NOTE — TELEPHONE ENCOUNTER
Caller: Paula Quispe    Relationship: Self    Best call back number: [phone]    What is the best time to reach you: ANYTIME    Who are you requesting to speak with (clinical staff, provider,  specific staff member): SCHEDULING      What was the call regarding: PT WOULD LIKE TO DO TELEPHONE OR MYCHART VISIT FOR TODAYS 3:30 APPT.    CALL TO DISCUSS    Do you require a callback: YES

## 2023-01-24 NOTE — PROGRESS NOTES
TELEMEDICINE FOLLOW-UP NOTE    01/24/2023    Problem List Items Addressed This Visit        Other    Iron deficiency anemia - Primary    Relevant Medications    ferrous sulfate 325 (65 FE) MG tablet    Other Relevant Orders    CBC & Differential    Ferritin    Iron Profile         Reason for Visit:  I personally contacted Paula Quispe  today in an effort to screen for coronavirus symptoms and also to perform their scheduled follow-up visit remotely in light of the coronavirus pandemic.  With respect to their specific risks for coronavirus, their screen is as follows:      COVID-19 RISK SCREEN     1. Has the patient had close contact without PPE with a lab confirmed COVID-19 (+) person or a person under investigation (PUI) for COVID-19 infection?  -- No     2. Has the patient had respiratory symptoms, worsened/new cough and/or SOA, unexplained fever, or sudden loss of smell and/or taste in the past 7 days? --  No    3. Does the patient have baseline higher exposure risk such as working in healthcare field or currently residing in healthcare facility?  --  No       They also denied any new respiratory symptoms or fever within the past 14 days.    I counseled them regarding safe practices for minimizing risk for infection including hand-washing, self-isolation, limiting contacts, and we also discussed what to do should they develop symptoms including fever, chills, new cough, shortness of breath, or new body aches.    SUBJECTIVE:  Referring Physician: Rahul Page     Chief Complaint:  Follow-up for iron deficiency anemia     History of Present Illness: Paula Quispe is a pleasant 47 y.o. female past medical history of Crohn's who presents today for evaluation of anemia. The patient was recently diagnosed with Crohn's disease and has been on Entyvio for least 4-5 injections.  States that she has been having intermittent abdominal pain and discomfort for almost 30 years prior to her diagnosis.  Has had partial  bowel resections with reanastomosis during this timeframe.  She has been monitoring labs which was been notable for hemoglobin around 11-12 over the past several months.  Patient denies any worsening fatigue or tiredness.  Denies any significant cravings for ice.  Denies any easy bleeding or bruising episodes.  Does note some mild restless leg syndrome symptoms at night.  Notes that her daughter has had to be on iron in the past.  She otherwise feels well today and has no major concerns or complaints    Interval History:  Presents via telemedicine for follow-up.  Has been having slightly worsening fatigue over the past several months.  Has been having to nap more frequently.  Denies any cravings for ice.  Denies any bleeding episodes    Review of systems:  10 point review of systems was complete is otherwise negative except as above    PMH/PSH/SH/FH:  Reviewed and unchanged since October 2022    EXAM FINDINGS AND/OR PROBLEMS:  General: Conversant, no acute distress  Neuro: Alert and orient x3  Psych: Mood congruent    ASSESSMENT/PLAN:   1. Other iron deficiency anemia (Primary)  -Unclear etiology likely related to malabsorption from her Crohn's disease  -Iron studies in October 2022 consistent with iron deficiency anemia however her hemoglobin was within normal limits  -Given that patient is more symptomatic, will plan to initiate ferrous sulfate 325 mg daily.  Instructed patient to take with food and vitamin C  -We will plan to repeat CBC and iron studies in 6 months and consider IV iron supplementation at that time    2.  Crohn's disease  -Complicates all aspects of care  -Follow with GI for medication management    RECOMMENDATIONS:  Follow-up in 6 months    Reginald Mooney MD

## 2023-02-09 ENCOUNTER — LAB (OUTPATIENT)
Dept: LAB | Facility: HOSPITAL | Age: 48
End: 2023-02-09
Payer: COMMERCIAL

## 2023-02-09 ENCOUNTER — OFFICE VISIT (OUTPATIENT)
Dept: GASTROENTEROLOGY | Facility: CLINIC | Age: 48
End: 2023-02-09
Payer: COMMERCIAL

## 2023-02-09 VITALS
DIASTOLIC BLOOD PRESSURE: 80 MMHG | HEIGHT: 66 IN | OXYGEN SATURATION: 99 % | TEMPERATURE: 97.3 F | SYSTOLIC BLOOD PRESSURE: 122 MMHG | HEART RATE: 75 BPM | WEIGHT: 172 LBS | BODY MASS INDEX: 27.64 KG/M2

## 2023-02-09 DIAGNOSIS — K58.1 IRRITABLE BOWEL SYNDROME WITH CONSTIPATION: ICD-10-CM

## 2023-02-09 DIAGNOSIS — K50.019 CROHN'S DISEASE OF SMALL INTESTINE WITH COMPLICATION: Primary | ICD-10-CM

## 2023-02-09 DIAGNOSIS — K50.019 CROHN'S DISEASE OF SMALL INTESTINE WITH COMPLICATION: ICD-10-CM

## 2023-02-09 LAB
ALBUMIN SERPL-MCNC: 4.3 G/DL (ref 3.5–5.2)
ALBUMIN/GLOB SERPL: 1.5 G/DL
ALP SERPL-CCNC: 69 U/L (ref 39–117)
ALT SERPL W P-5'-P-CCNC: 42 U/L (ref 1–33)
ANION GAP SERPL CALCULATED.3IONS-SCNC: 10.1 MMOL/L (ref 5–15)
AST SERPL-CCNC: 40 U/L (ref 1–32)
BASOPHILS # BLD AUTO: 0.03 10*3/MM3 (ref 0–0.2)
BASOPHILS NFR BLD AUTO: 0.5 % (ref 0–1.5)
BILIRUB SERPL-MCNC: 0.4 MG/DL (ref 0–1.2)
BUN SERPL-MCNC: 12 MG/DL (ref 6–20)
BUN/CREAT SERPL: 19.7 (ref 7–25)
CALCIUM SPEC-SCNC: 8.8 MG/DL (ref 8.6–10.5)
CHLORIDE SERPL-SCNC: 106 MMOL/L (ref 98–107)
CO2 SERPL-SCNC: 24.9 MMOL/L (ref 22–29)
CREAT SERPL-MCNC: 0.61 MG/DL (ref 0.57–1)
CRP SERPL-MCNC: <0.3 MG/DL (ref 0–0.5)
DEPRECATED RDW RBC AUTO: 40.9 FL (ref 37–54)
EGFRCR SERPLBLD CKD-EPI 2021: 110.4 ML/MIN/1.73
EOSINOPHIL # BLD AUTO: 0.06 10*3/MM3 (ref 0–0.4)
EOSINOPHIL NFR BLD AUTO: 1.1 % (ref 0.3–6.2)
ERYTHROCYTE [DISTWIDTH] IN BLOOD BY AUTOMATED COUNT: 13.1 % (ref 12.3–15.4)
GLOBULIN UR ELPH-MCNC: 2.9 GM/DL
GLUCOSE SERPL-MCNC: 90 MG/DL (ref 65–99)
HCT VFR BLD AUTO: 36.7 % (ref 34–46.6)
HGB BLD-MCNC: 12.4 G/DL (ref 12–15.9)
IMM GRANULOCYTES # BLD AUTO: 0.03 10*3/MM3 (ref 0–0.05)
IMM GRANULOCYTES NFR BLD AUTO: 0.5 % (ref 0–0.5)
LYMPHOCYTES # BLD AUTO: 1.01 10*3/MM3 (ref 0.7–3.1)
LYMPHOCYTES NFR BLD AUTO: 18 % (ref 19.6–45.3)
MCH RBC QN AUTO: 29.3 PG (ref 26.6–33)
MCHC RBC AUTO-ENTMCNC: 33.8 G/DL (ref 31.5–35.7)
MCV RBC AUTO: 86.8 FL (ref 79–97)
MONOCYTES # BLD AUTO: 0.43 10*3/MM3 (ref 0.1–0.9)
MONOCYTES NFR BLD AUTO: 7.7 % (ref 5–12)
NEUTROPHILS NFR BLD AUTO: 4.05 10*3/MM3 (ref 1.7–7)
NEUTROPHILS NFR BLD AUTO: 72.2 % (ref 42.7–76)
NRBC BLD AUTO-RTO: 0 /100 WBC (ref 0–0.2)
PLATELET # BLD AUTO: 323 10*3/MM3 (ref 140–450)
PMV BLD AUTO: 9.4 FL (ref 6–12)
POTASSIUM SERPL-SCNC: 3.9 MMOL/L (ref 3.5–5.2)
PROT SERPL-MCNC: 7.2 G/DL (ref 6–8.5)
RBC # BLD AUTO: 4.23 10*6/MM3 (ref 3.77–5.28)
SODIUM SERPL-SCNC: 141 MMOL/L (ref 136–145)
WBC NRBC COR # BLD: 5.61 10*3/MM3 (ref 3.4–10.8)

## 2023-02-09 PROCEDURE — 86480 TB TEST CELL IMMUN MEASURE: CPT

## 2023-02-09 PROCEDURE — 36415 COLL VENOUS BLD VENIPUNCTURE: CPT

## 2023-02-09 PROCEDURE — 85025 COMPLETE CBC W/AUTO DIFF WBC: CPT

## 2023-02-09 PROCEDURE — 99215 OFFICE O/P EST HI 40 MIN: CPT | Performed by: NURSE PRACTITIONER

## 2023-02-09 PROCEDURE — 86140 C-REACTIVE PROTEIN: CPT

## 2023-02-09 PROCEDURE — 80053 COMPREHEN METABOLIC PANEL: CPT

## 2023-02-09 NOTE — PROGRESS NOTES
Follow Up      Patient Name: Paula Quispe  : 1975   MRN: [id number]     Chief Complaint:    Chief Complaint   Patient presents with   • Follow-up       History of Present Illness: Paula Quispe is a 48 y.o. female who is here today for follow up on Crohn's disease.    Crohn's:  DX: in  but has had symptoms for 30 years without a formal diagnosis  Location: small bowel and colon  Complication:colon resection, hx of psbo  Treatments: ayla      Previously was having bloating and sensation of not completely emptying her bowels- was started on linzess for suspected IBS-C. Currenty taking linzess and having less tenesmus and bloating but having about 5 stools a day between bristol scale 5-7.  There is no blood in the stool. She does have dark stools but is on oral iron. There is no abdominal pain.  There is no weight loss.  Her appetite is good.  There is no nausea, vomiting, fever, chills, GERD, dysphagia, joint swelling, rash, or eye pain.  She does feel better on the entivyo. She has questions about how long she should be on treatment.       She is followed by Hematology for GWENDOLYN    CT Enterography Abdomen Pelvis w Contrast (2021 10:26)IMPRESSION:  No abnormality seen at the anastomoses. There is some  increased stool seen in the distal colon suggesting clinical  presentation of constipation. No evidence of abnormal wall thickening or  surrounding inflammatory changes. Fatty infiltration identified of the  liver    SCANNED - COLONOSCOPY (2021)-hypertrophic anal papillae, internal hemorrhoids, surgical anastomosis that is healthy appearing in the sigmoid, fair prep, 3 aphthous ulcers in the ascending colon anastomosis,    Subjective      Review of Systems:   Review of Systems   Constitutional: Negative for appetite change and unexpected weight loss.   HENT: Negative for trouble swallowing.    Gastrointestinal: Positive for diarrhea. Negative for abdominal distention, abdominal  pain, anal bleeding, blood in stool, constipation, nausea, rectal pain, vomiting, GERD and indigestion.       Medications:     Current Outpatient Medications:   •  Cetirizine-Pseudoephedrine (ZYRTEC-D PO), Every 12 (Twelve) Hours., Disp: , Rfl:   •  ferrous sulfate 325 (65 FE) MG tablet, Take 1 tablet by mouth Daily With Breakfast., Disp: 30 tablet, Rfl: 5  •  linaclotide (Linzess) 145 MCG capsule capsule, Take 1 capsule by mouth 30 minutes before breakfast daily., Disp: 90 capsule, Rfl: 3  •  lisinopril (PRINIVIL,ZESTRIL) 2.5 MG tablet, Take 2.5 mg by mouth Daily., Disp: , Rfl:   •  magnesium oxide (MAG-OX) 400 MG tablet, Take 1 tablet by mouth Daily., Disp: 90 tablet, Rfl: 3  •  Magnesium Oxide 400 (240 Mg) MG tablet, Take 1 tablet by mouth Daily., Disp: , Rfl:   •  polyethylene glycol (MIRALAX) 17 g packet, Take 17 g by mouth Daily., Disp: 100 packet, Rfl: 3    Allergies:   Allergies   Allergen Reactions   • Phenergan [Promethazine Hcl] Hives       Social History:   Social History     Socioeconomic History   • Marital status:    Tobacco Use   • Smoking status: Never   • Smokeless tobacco: Never   Vaping Use   • Vaping Use: Never used   Substance and Sexual Activity   • Alcohol use: No   • Drug use: No        Surgical History:   Past Surgical History:   Procedure Laterality Date   •  SECTION     • COLONOSCOPY     • DIAGNOSTIC LAPAROSCOPY      Dx with endometriosis   • ILEOSTOMY CLOSURE  2016    Dr. Hughes - Takedown   • LUMBAR DISCECTOMY     • SUBTOTAL COLECTOMY  2016    Dr. Hughes - Sigmoid colectomy for bowel obstruction with mucous fistula creastion   • TONSILLECTOMY          Medical History:   Past Medical History:   Diagnosis Date   • Chronic constipation     Resolved after partial colectomy   • COVID 2021   • Crohn's disease (HCC)    • Endometriosis    • Large bowel obstruction (HCC) 2016   • Small bowel obstruction (HCC) 2021    Admitted in [place], [place]     "    Objective     Physical Exam:  Vital Signs:   Vitals:    02/09/23 0753   BP: 122/80   BP Location: Right arm   Patient Position: Sitting   Cuff Size: Adult   Pulse: 75   Temp: 97.3 °F (36.3 °C)   TempSrc: Temporal   SpO2: 99%   Weight: 78 kg (172 lb)   Height: 167.6 cm (66\")     Body mass index is 27.76 kg/m².     Physical Exam  Vitals and nursing note reviewed.   Constitutional:       General: She is not in acute distress.     Appearance: She is well-developed. She is not diaphoretic.   Eyes:      General: No scleral icterus.     Conjunctiva/sclera: Conjunctivae normal.   Neck:      Thyroid: No thyromegaly.   Cardiovascular:      Rate and Rhythm: Normal rate and regular rhythm.   Pulmonary:      Effort: Pulmonary effort is normal.      Breath sounds: Normal breath sounds.   Abdominal:      General: Bowel sounds are normal. There is no distension.      Palpations: Abdomen is soft.      Tenderness: There is abdominal tenderness in the suprapubic area. There is no guarding or rebound.      Hernia: No hernia is present.   Musculoskeletal:      Cervical back: Neck supple.      Right lower leg: No edema.      Left lower leg: No edema.   Skin:     General: Skin is warm and dry.      Capillary Refill: Capillary refill takes 2 to 3 seconds.      Coloration: Skin is not jaundiced or pale.      Findings: No bruising or petechiae.      Nails: There is no clubbing.   Neurological:      Mental Status: She is alert and oriented to person, place, and time.   Psychiatric:         Behavior: Behavior normal.         Thought Content: Thought content normal.         Judgment: Judgment normal.         Assessment / Plan      Assessment/Plan:   Diagnoses and all orders for this visit:    1. Crohn's disease of small intestine with complication (HCC) (Primary)  -     CBC & Differential; Future  -     C-reactive Protein; Future  -     Comprehensive Metabolic Panel; Future  -     QuantiFERON TB Plus Client Incubated; Future  -     " Calprotectin, Fecal - Stool, Per Rectum; Future  -     Clostridioides difficile Toxin, PCR - Stool, Per Rectum; Future  -     Gastrointestinal Panel, PCR - Stool, Per Rectum; Future  -      colonoscopy   I am unsure if her stool frequency is secondary to her Crohn's disease, postsurgical changes, or side effect to the Linzess.  I have given her a sample of the 72 mcg of Linzess to try.  If she thinks this works better with less stool frequency, she will call the clinic.  She has been on treatment for 1 year, I recommend endoscopic evaluation to document mucosal healing  Disease education provided today and educational booklet given.  We discussed the side effects of Entyvio and I gave her the immunosuppressant checklist which we reviewed together.  Immunosuppressed Patient Check-list    • Visit dermatologist yearly for skin cancer screening  • Wear sunscreen and protect skin from sun  • Avoid tobacco products and excessive alcohol use  • Stay up to date on your immunizations to prevent common infections  • Females: annual pap smears and routine mammograms  • Practice safe sex with condom use to prevent STD  • Begin screening colonoscopies at regular intervals determined by your gastroenterologist  • Keep regularly schedule appts for lab work with your gastroenterologist and primary care provider  • Follow a well-balanced diet with regular exercise  • Patients on chronic steroids should receive regular osteoporosis screening      2. Irritable bowel syndrome with constipation  - see # 1       Follow Up:   Return in about 4 months (around 6/9/2023), or if symptoms worsen or fail to improve.    Plan of care reviewed with the patient at the conclusion of today's visit.  Education was provided regarding diagnosis, management, and any prescribed or recommended OTC medications.  Patient verbalized understanding of and agreement with management plan.     Time Statement:   Discussed plan of care in detail with patient today.  Patient verbally understands and agrees. I have spent 45 minutes reviewing available diagnostics, obtaining history, examining the patient, developing a treatment plan, and educating the patient on disease process and plan of care.     ISIDRO Augustin  [facility]

## 2023-02-12 LAB
GAMMA INTERFERON BACKGROUND BLD IA-ACNC: 0.23 IU/ML
M TB IFN-G BLD-IMP: NEGATIVE
M TB IFN-G CD4+ T-CELLS BLD-ACNC: 0.02 IU/ML
M TBIFN-G CD4+ CD8+T-CELLS BLD-ACNC: 0.01 IU/ML
MITOGEN IGNF BLD-ACNC: >10 IU/ML
SERVICE CMNT-IMP: NORMAL

## 2023-02-13 ENCOUNTER — TELEPHONE (OUTPATIENT)
Dept: GASTROENTEROLOGY | Facility: CLINIC | Age: 48
End: 2023-02-13
Payer: COMMERCIAL

## 2023-02-13 NOTE — TELEPHONE ENCOUNTER
----- Message from ISIDRO Cervantes sent at 2/13/2023  7:46 AM EST -----  Can you let her know that her liver enzymes are mildly elevated.  I reviewed her previous CT scan it does show some mild fatty liver.  Fatty liver is managed with heart healthy diet and exercise but sometimes Crohn's contributes to fatty liver so managing the Crohn's is also part of the treatment plan.  We will repeat her liver enzymes at her follow-up.

## 2023-02-22 ENCOUNTER — TELEPHONE (OUTPATIENT)
Dept: GASTROENTEROLOGY | Facility: CLINIC | Age: 48
End: 2023-02-22
Payer: COMMERCIAL

## 2023-02-22 NOTE — TELEPHONE ENCOUNTER
I SPOKE WITH MS TRUPTI. INFORMED HER THAT ENTYVIO INFUSION HAS BEEN APPROVED THROUGH 2/24/2023 FOR [facility].

## 2023-03-31 RX ORDER — SODIUM PICOSULFATE, MAGNESIUM OXIDE, AND ANHYDROUS CITRIC ACID 10; 3.5; 12 MG/160ML; G/160ML; G/160ML
320 LIQUID ORAL TAKE AS DIRECTED
Qty: 320 ML | Refills: 0 | Status: SHIPPED | OUTPATIENT
Start: 2023-03-31

## 2023-04-13 ENCOUNTER — OUTSIDE FACILITY SERVICE (OUTPATIENT)
Dept: GASTROENTEROLOGY | Facility: CLINIC | Age: 48
End: 2023-04-13
Payer: COMMERCIAL

## 2023-04-13 PROCEDURE — 45378 DIAGNOSTIC COLONOSCOPY: CPT | Performed by: INTERNAL MEDICINE

## 2023-08-29 ENCOUNTER — HOSPITAL ENCOUNTER (OUTPATIENT)
Dept: MAMMOGRAPHY | Facility: HOSPITAL | Age: 48
Discharge: HOME | End: 2023-08-29
Admitting: RADIOLOGY
Payer: COMMERCIAL

## 2023-08-29 DIAGNOSIS — R92.8 ABNORMAL MAMMOGRAM: ICD-10-CM

## 2023-08-29 PROCEDURE — 77065 DX MAMMO INCL CAD UNI: CPT

## 2023-08-29 PROCEDURE — G0279 TOMOSYNTHESIS, MAMMO: HCPCS

## 2023-09-06 NOTE — TELEPHONE ENCOUNTER
Rx Refill Note  Pending Prescriptions:                       Disp   Refills    Linzess 145 MCG capsule capsule [Pharmacy *90 cap*0        Sig: TAKE 1 CAPSULE BY MOUTH ONCE DAILY 30  MINUTES            BEFORE  BREAKFAST    Last office visit with prescribing clinician: 9/7/2022   Last telemedicine visit with prescribing clinician: Visit date not found   Next office visit with prescribing clinician: 12/14/2023         Lexus Banda MA  09/06/23, 16:00 EDT

## 2023-10-04 ENCOUNTER — TELEPHONE (OUTPATIENT)
Dept: ONCOLOGY | Facility: CLINIC | Age: 48
End: 2023-10-04
Payer: COMMERCIAL

## 2023-11-16 ENCOUNTER — OFFICE VISIT (OUTPATIENT)
Dept: ONCOLOGY | Facility: CLINIC | Age: 48
End: 2023-11-16
Payer: COMMERCIAL

## 2023-11-16 ENCOUNTER — LAB (OUTPATIENT)
Dept: LAB | Facility: HOSPITAL | Age: 48
End: 2023-11-16
Payer: COMMERCIAL

## 2023-11-16 VITALS
WEIGHT: 174 LBS | BODY MASS INDEX: 27.97 KG/M2 | OXYGEN SATURATION: 99 % | TEMPERATURE: 97.3 F | RESPIRATION RATE: 16 BRPM | DIASTOLIC BLOOD PRESSURE: 82 MMHG | HEART RATE: 80 BPM | HEIGHT: 66 IN | SYSTOLIC BLOOD PRESSURE: 149 MMHG

## 2023-11-16 DIAGNOSIS — D50.8 OTHER IRON DEFICIENCY ANEMIA: ICD-10-CM

## 2023-11-16 DIAGNOSIS — D50.8 OTHER IRON DEFICIENCY ANEMIA: Primary | ICD-10-CM

## 2023-11-16 LAB
BASOPHILS # BLD AUTO: 0.04 10*3/MM3 (ref 0–0.2)
BASOPHILS NFR BLD AUTO: 0.7 % (ref 0–1.5)
DEPRECATED RDW RBC AUTO: 39 FL (ref 37–54)
EOSINOPHIL # BLD AUTO: 0.06 10*3/MM3 (ref 0–0.4)
EOSINOPHIL NFR BLD AUTO: 1 % (ref 0.3–6.2)
ERYTHROCYTE [DISTWIDTH] IN BLOOD BY AUTOMATED COUNT: 12 % (ref 12.3–15.4)
FERRITIN SERPL-MCNC: 41.19 NG/ML (ref 13–150)
HCT VFR BLD AUTO: 37.1 % (ref 34–46.6)
HGB BLD-MCNC: 12.6 G/DL (ref 12–15.9)
IMM GRANULOCYTES # BLD AUTO: 0.02 10*3/MM3 (ref 0–0.05)
IMM GRANULOCYTES NFR BLD AUTO: 0.3 % (ref 0–0.5)
IRON 24H UR-MRATE: 114 MCG/DL (ref 37–145)
IRON SATN MFR SERPL: 21 % (ref 20–50)
LYMPHOCYTES # BLD AUTO: 1.46 10*3/MM3 (ref 0.7–3.1)
LYMPHOCYTES NFR BLD AUTO: 24.5 % (ref 19.6–45.3)
MCH RBC QN AUTO: 29.9 PG (ref 26.6–33)
MCHC RBC AUTO-ENTMCNC: 34 G/DL (ref 31.5–35.7)
MCV RBC AUTO: 87.9 FL (ref 79–97)
MONOCYTES # BLD AUTO: 0.49 10*3/MM3 (ref 0.1–0.9)
MONOCYTES NFR BLD AUTO: 8.2 % (ref 5–12)
NEUTROPHILS NFR BLD AUTO: 3.9 10*3/MM3 (ref 1.7–7)
NEUTROPHILS NFR BLD AUTO: 65.3 % (ref 42.7–76)
NRBC BLD AUTO-RTO: 0 /100 WBC (ref 0–0.2)
PLATELET # BLD AUTO: 348 10*3/MM3 (ref 140–450)
PMV BLD AUTO: 9 FL (ref 6–12)
RBC # BLD AUTO: 4.22 10*6/MM3 (ref 3.77–5.28)
TIBC SERPL-MCNC: 550 MCG/DL (ref 298–536)
TRANSFERRIN SERPL-MCNC: 369 MG/DL (ref 200–360)
WBC NRBC COR # BLD AUTO: 5.97 10*3/MM3 (ref 3.4–10.8)

## 2023-11-16 PROCEDURE — 99214 OFFICE O/P EST MOD 30 MIN: CPT | Performed by: INTERNAL MEDICINE

## 2023-11-16 PROCEDURE — 83540 ASSAY OF IRON: CPT

## 2023-11-16 PROCEDURE — 36415 COLL VENOUS BLD VENIPUNCTURE: CPT

## 2023-11-16 PROCEDURE — 82728 ASSAY OF FERRITIN: CPT

## 2023-11-16 PROCEDURE — 85025 COMPLETE CBC W/AUTO DIFF WBC: CPT

## 2023-11-16 PROCEDURE — 84466 ASSAY OF TRANSFERRIN: CPT

## 2023-11-16 RX ORDER — HYDROCHLOROTHIAZIDE 25 MG/1
25 TABLET ORAL DAILY
COMMUNITY
Start: 2023-10-14

## 2023-11-16 RX ORDER — LOSARTAN POTASSIUM 50 MG/1
1 TABLET ORAL DAILY
COMMUNITY
Start: 2023-10-25

## 2023-11-16 NOTE — PROGRESS NOTES
Follow Up Office Visit      Date: 2023     Patient Name: Paula Quispe  MRN: [id number]  : 1975  Referring Physician: Rahul Page     Chief Complaint:  Follow-up for iron deficiency anemia     History of Present Illness: Paula Quispe is a pleasant 47 y.o. female past medical history of Crohn's who presents today for evaluation of anemia. The patient was recently diagnosed with Crohn's disease and has been on Entyvio for least 4-5 injections.  States that she has been having intermittent abdominal pain and discomfort for almost 30 years prior to her diagnosis.  Has had partial bowel resections with reanastomosis during this timeframe.  She has been monitoring labs which was been notable for hemoglobin around 11-12 over the past several months.  Patient denies any worsening fatigue or tiredness.  Denies any significant cravings for ice.  Denies any easy bleeding or bruising episodes.  Does note some mild restless leg syndrome symptoms at night.  Notes that her daughter has had to be on iron in the past.  She otherwise feels well today and has no major concerns or complaints     Interval History:  Presents to clinic for follow-up.  Was unable to tolerate oral iron due to GI upset.  Denies any worsening fatigue or tiredness.  Notes that she still goes to bed early but is now planning women's volleyball league and this has been giving her more energy.  Remains on Entyvio for her Crohn's disease    Oncology History:    Oncology/Hematology History    No history exists.       Subjective      Review of Systems:   Constitutional: Negative for fevers, chills, or weight loss  Eyes: Negative for blurred vision or discharge         Ear/Nose/Throat: Negative for difficulty swallowing, sore throat, LAD                                                       Respiratory: Negative for cough, SOA, wheezing                                                                                        Cardiovascular:  Negative for chest pain or palpitations                                                                  Gastrointestinal: Negative for nausea, vomiting or diarrhea                                                                     Genitourinary: Negative for dysuria or hematuria                                                                                           Musculoskeletal: Negative for any joint pains or muscle aches                                                                        Neurologic: Negative for any weakness, headaches, dizziness                                                                         Hematologic: Negative for any easy bleeding or bruising                                                                                   Psychiatric: Negative for anxiety or depression                          Past Medical History/Past Surgical History/ Family History/ Social History: Reviewed by me and unchanged from my previous documentation done on January 2023.     Medications:     Current Outpatient Medications:     hydroCHLOROthiazide (HYDRODIURIL) 25 MG tablet, Take 1 tablet by mouth Daily., Disp: , Rfl:     losartan (COZAAR) 50 MG tablet, Take 1 tablet by mouth Daily., Disp: , Rfl:     Cetirizine-Pseudoephedrine (ZYRTEC-D PO), Every 12 (Twelve) Hours., Disp: , Rfl:     ferrous sulfate 325 (65 FE) MG tablet, Take 1 tablet by mouth Daily With Breakfast., Disp: 30 tablet, Rfl: 5    linaclotide (Linzess) 145 MCG capsule capsule, TAKE 1 CAPSULE BY MOUTH ONCE DAILY 30  MINUTES  BEFORE  BREAKFAST, Disp: 90 capsule, Rfl: 0    Magnesium Oxide 400 (240 Mg) MG tablet, Take 1 tablet by mouth Daily., Disp: , Rfl:     Allergies:   Allergies   Allergen Reactions    Phenergan [Promethazine Hcl] Hives       Objective     Physical Exam:  Vital Signs:   Vitals:    11/16/23 1455   BP: 149/82   Pulse: 80   Resp: 16   Temp: 97.3 °F (36.3 °C)   TempSrc: Temporal   SpO2: 99%   Weight: 78.9 kg (174 lb)  "  Height: 167.6 cm (66\")   PainSc: 0-No pain     Pain Score    11/16/23 1455   PainSc: 0-No pain     ECOG Performance Status: 0 - Asymptomatic    Constitutional: NAD, ECOG 0  Eyes: PERRLA, scleral anicteric  ENT: No LAD, no thyromegaly  Respiratory: CTAB, no wheezing, rales, rhonchi  Cardiovascular: RRR, no murmurs, pulses 2+ bilaterally  Abdomen: soft, NT/ND, no HSM  Musculoskeletal: strength 5/5 bilaterally, no c/c/e  Neurologic: A&O x 3, CN II-XII intact grossly    Results Review:   No visits with results within 2 Week(s) from this visit.   Latest known visit with results is:   Lab on 02/09/2023   Component Date Value Ref Range Status    C-Reactive Protein 02/09/2023 <0.30  0.00 - 0.50 mg/dL Final    Glucose 02/09/2023 90  65 - 99 mg/dL Final    BUN 02/09/2023 12  6 - 20 mg/dL Final    Creatinine 02/09/2023 0.61  0.57 - 1.00 mg/dL Final    Sodium 02/09/2023 141  136 - 145 mmol/L Final    Potassium 02/09/2023 3.9  3.5 - 5.2 mmol/L Final    Chloride 02/09/2023 106  98 - 107 mmol/L Final    CO2 02/09/2023 24.9  22.0 - 29.0 mmol/L Final    Calcium 02/09/2023 8.8  8.6 - 10.5 mg/dL Final    Total Protein 02/09/2023 7.2  6.0 - 8.5 g/dL Final    Albumin 02/09/2023 4.3  3.5 - 5.2 g/dL Final    ALT (SGPT) 02/09/2023 42 (H)  1 - 33 U/L Final    AST (SGOT) 02/09/2023 40 (H)  1 - 32 U/L Final    Alkaline Phosphatase 02/09/2023 69  39 - 117 U/L Final    Total Bilirubin 02/09/2023 0.4  0.0 - 1.2 mg/dL Final    Globulin 02/09/2023 2.9  gm/dL Final    A/G Ratio 02/09/2023 1.5  g/dL Final    BUN/Creatinine Ratio 02/09/2023 19.7  7.0 - 25.0 Final    Anion Gap 02/09/2023 10.1  5.0 - 15.0 mmol/L Final    eGFR 02/09/2023 110.4  >60.0 mL/min/1.73 Final    QuantiFERON Criteria 02/09/2023 Comment   Final    QuantiFERON-TB Gold Plus is a qualitative indirect test for  M tuberculosis infection (including disease) and is intended for use  in conjunction with risk assessment, radiography, and other medical  and diagnostic evaluations. The " QuantiFERON-TB Gold Plus result is  determined by subtracting the Nil value from either TB antigen (Ag)  value. The Mitogen tube serves as a control for the test.    QUANTIFERON TB1 AG VALUE 02/09/2023 0.02  IU/mL Final    QUANTIFERON TB2 AG VALUE 02/09/2023 0.01  IU/mL Final    QuantiFERON Nil Value 02/09/2023 0.23  IU/mL Final    QuantiFERON Mitogen Value 02/09/2023 >10.00  IU/mL Final    QUANTIFERON-TB GOLD PLUS 02/09/2023 Negative  Negative Final    No response to M tuberculosis antigens detected.  Infection with M tuberculosis is unlikely, but high risk  individuals should be considered for additional testing  (ATS/IDSA/CDC Clinical Practice Guidelines, 2017). The  reference range is an Antigen minus Nil result of <0.35 IU/mL.  The specimen received for QuantiFERON testing was incubated by the  ordering institution. Specific procedures outlined in our Directory  of Services and in the package insert for the QuantiFERON Gold  (In Tube) test must be followed to enable for proper stimulation of  cells for the production of interferon gamma.  Chemiluminescence immunoassay methodology    WBC 02/09/2023 5.61  3.40 - 10.80 10*3/mm3 Final    RBC 02/09/2023 4.23  3.77 - 5.28 10*6/mm3 Final    Hemoglobin 02/09/2023 12.4  12.0 - 15.9 g/dL Final    Hematocrit 02/09/2023 36.7  34.0 - 46.6 % Final    MCV 02/09/2023 86.8  79.0 - 97.0 fL Final    MCH 02/09/2023 29.3  26.6 - 33.0 pg Final    MCHC 02/09/2023 33.8  31.5 - 35.7 g/dL Final    RDW 02/09/2023 13.1  12.3 - 15.4 % Final    RDW-SD 02/09/2023 40.9  37.0 - 54.0 fl Final    MPV 02/09/2023 9.4  6.0 - 12.0 fL Final    Platelets 02/09/2023 323  140 - 450 10*3/mm3 Final    Neutrophil % 02/09/2023 72.2  42.7 - 76.0 % Final    Lymphocyte % 02/09/2023 18.0 (L)  19.6 - 45.3 % Final    Monocyte % 02/09/2023 7.7  5.0 - 12.0 % Final    Eosinophil % 02/09/2023 1.1  0.3 - 6.2 % Final    Basophil % 02/09/2023 0.5  0.0 - 1.5 % Final    Immature Grans % 02/09/2023 0.5  0.0 - 0.5 % Final     Neutrophils, Absolute 02/09/2023 4.05  1.70 - 7.00 10*3/mm3 Final    Lymphocytes, Absolute 02/09/2023 1.01  0.70 - 3.10 10*3/mm3 Final    Monocytes, Absolute 02/09/2023 0.43  0.10 - 0.90 10*3/mm3 Final    Eosinophils, Absolute 02/09/2023 0.06  0.00 - 0.40 10*3/mm3 Final    Basophils, Absolute 02/09/2023 0.03  0.00 - 0.20 10*3/mm3 Final    Immature Grans, Absolute 02/09/2023 0.03  0.00 - 0.05 10*3/mm3 Final    nRBC 02/09/2023 0.0  0.0 - 0.2 /100 WBC Final       No results found.    Assessment / Plan      Assessment/Plan:   1. Other iron deficiency anemia (Primary)  -Unclear etiology likely related to malabsorption from her Crohn's disease  -Iron studies in October 2022 consistent with iron deficiency anemia however her hemoglobin was within normal limits  -Was started on oral iron in January 2023.  Not tolerate this due to GI upset  -We will plan to repeat iron studies today  -We will consider IV infusions based off results     2.  Crohn's disease  -Complicates all aspects of care  -Currently on Entyvio  -Last colonoscopy in April 2023  -Continue following with GI for medication management         Follow Up:   Follow-up in 6 months or sooner if needed     Reginald Mooney MD  Hematology and Oncology     Please note that portions of this note may have been completed with a voice recognition program. Efforts were made to edit the dictations, but occasionally words are mistranscribed.

## 2023-11-17 ENCOUNTER — TELEPHONE (OUTPATIENT)
Dept: ONCOLOGY | Facility: CLINIC | Age: 48
End: 2023-11-17
Payer: COMMERCIAL

## 2023-11-17 NOTE — TELEPHONE ENCOUNTER
I called patient to let her know that Dr. Mooney reviewed her labs from yesterday and her iron levels look great.  No intervention needed and she can keep her 6 month followup.  I had to leave voicemail as patient did not answer.

## 2023-12-14 ENCOUNTER — OFFICE VISIT (OUTPATIENT)
Dept: GASTROENTEROLOGY | Facility: CLINIC | Age: 48
End: 2023-12-14
Payer: COMMERCIAL

## 2023-12-14 VITALS
TEMPERATURE: 97.3 F | BODY MASS INDEX: 28.37 KG/M2 | HEART RATE: 82 BPM | SYSTOLIC BLOOD PRESSURE: 110 MMHG | WEIGHT: 175.8 LBS | OXYGEN SATURATION: 92 % | DIASTOLIC BLOOD PRESSURE: 78 MMHG

## 2023-12-14 DIAGNOSIS — K50.019 CROHN'S DISEASE OF SMALL INTESTINE WITH COMPLICATION: Primary | ICD-10-CM

## 2023-12-14 PROCEDURE — 99214 OFFICE O/P EST MOD 30 MIN: CPT | Performed by: INTERNAL MEDICINE

## 2023-12-14 NOTE — PROGRESS NOTES
PCP: Provider, No Known    Chief Complaint   Patient presents with    Follow-up       History of Present Illness:   Paula Quispe is a 48 y.o. female who presents for f/u for crohn's disease.  No fever. Has had many years of constipation. No gib loss. No fever. Feeling well overall.   On entyvio and linzess.  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     Past Medical History:   Diagnosis Date    Chronic constipation     Resolved after partial colectomy    COVID 2021    Crohn's disease     Endometriosis     Large bowel obstruction 2016    Small bowel obstruction 2021    Admitted in [place], [place]       Past Surgical History:   Procedure Laterality Date     SECTION      COLONOSCOPY      DIAGNOSTIC LAPAROSCOPY      Dx with endometriosis    ILEOSTOMY CLOSURE  2016    Dr. Hughes - Takedown    LUMBAR DISCECTOMY      SUBTOTAL COLECTOMY  2016    Dr. Hughes - Sigmoid colectomy for bowel obstruction with mucous fistula creastion    TONSILLECTOMY           Current Outpatient Medications:     Cetirizine-Pseudoephedrine (ZYRTEC-D PO), Every 12 (Twelve) Hours., Disp: , Rfl:     hydroCHLOROthiazide (HYDRODIURIL) 25 MG tablet, Take 1 tablet by mouth Daily., Disp: , Rfl:     linaclotide (Linzess) 145 MCG capsule capsule, TAKE 1 CAPSULE BY MOUTH ONCE DAILY 30  MINUTES  BEFORE  BREAKFAST, Disp: 90 capsule, Rfl: 0    losartan (COZAAR) 50 MG tablet, Take 1 tablet by mouth Daily., Disp: , Rfl:     ferrous sulfate 325 (65 FE) MG tablet, Take 1 tablet by mouth Daily With Breakfast. (Patient not taking: Reported on 2023), Disp: 30 tablet, Rfl: 5    Magnesium Oxide 400 (240 Mg) MG tablet, Take 1 tablet by mouth Daily. (Patient not taking: Reported on 2023), Disp: , Rfl:     Allergies   Allergen Reactions    Phenergan [Promethazine Hcl] Hives       Family History   Problem Relation Age of Onset    Cervical cancer Mother     Lung cancer Mother      Breast cancer Maternal Aunt 70    Breast cancer Maternal Aunt 80    Ovarian cancer Neg Hx     Colon cancer Neg Hx     Colon polyps Neg Hx     Esophageal cancer Neg Hx        Social History     Socioeconomic History    Marital status:    Tobacco Use    Smoking status: Never    Smokeless tobacco: Never   Vaping Use    Vaping Use: Never used   Substance and Sexual Activity    Alcohol use: No    Drug use: No       Review of Systems    A complete 12 point ros was asked and is negative except for that mentioned above.  In particular:  No fever  No rash  No increased arthralgias  No worsening edema  No cough  No dyspnea  No chest pain    Vitals:    12/14/23 1424   BP: 110/78   Pulse: 82   Temp: 97.3 °F (36.3 °C)   SpO2: 92%       Physical Exam  General: well developed, well nourished  A+O x 3 NAD  HEENT: NCAT, pupils equal appearing, sclera appear white  NECK: full ROM  Respiratory: symmetric chest rise, normal effort, normal work of breathing, no overt rales  Abdomen: non-distended  Skin: normal color, no jaundice  Neuro: no tremor, no facial droop  Psych: normal mood and affect      Assessment/Plan  1.) Crohn's disease History of colon resection, 2016  2.) Apthous ulcers at surgical anastamosis  3.) History of benign abdominal mass, inflammatory, granulomatous  4.) history of poorly healing ileostomy wound   5.) Chronic diarrhea with loss of icv  6.) Recurrent psbo, last 8/2021    Suspect crohn's disease +/- component of IBS-C  Background:  - colonoscopy: fair prep, 3 diminutive apthous ulcerations at anastamosis  - benign inflammatory mass resection  - CTE 9/2021: no abnormality at anastamosis; increased stool burden in distal colon  - psbo management 8/2021 at [facility]  - due to no overt obstruction, bowel prep given and no improvement in symptoms  -   Due to 3 apthous ulcers at anastamosis and continued discomfort with h/o psbo 8/2021, entyvio was started      Plan:  Reviewed cbc  Continue entyvio    7.)  Anemia  Follows with dr. Mooney, repeat hemogram hg : 12    8) medication management  Cmp reviewed. Mild ast/alt elevation low 40s due to non alcoholic fatty liver    9.) IBS-C  continue linzess 145, no sign of psbo, previous constipation issues preceding crohn's diagnosis    10.) Abnormal alt, [place]  Exercise/diet/ wt loss; repeat cmp in a few months  Moises Page MD  12/14/2023

## 2024-01-25 NOTE — TELEPHONE ENCOUNTER
Rx Refill Note  Pending Prescriptions:                       Disp   Refills    Linzess 145 MCG capsule capsule [Pharmacy *90 cap*0        Sig: TAKE 1 CAPSULE BY MOUTH ONCE DAILY 30  MINUTES            BEFORE  BREAKFAST    Last office visit with prescribing clinician: 12/14/2023   Last telemedicine visit with prescribing clinician: Visit date not found   Next office visit with prescribing clinician: 6/13/2024         Lexus Banda MA  01/25/24, 15:01 EST

## 2024-01-31 NOTE — PROGRESS NOTES
Subjective   Chief Complaint   Patient presents with    Gynecologic Exam     Paula Quispe is a 49 y.o. year old  presenting to be seen for her annual exam along with follow-up on her mixed urinary incontinence and history of loose stools.  She still has mixed incontinence.  After correction in a couple years she may think about doing something more.  She gets up in the night a couple times.  Her stools are stable.  Her inflammatory bowel disorder is overall stable.  She has been started on antihypertensive medication this year and her blood pressures are better controlled.    SEXUAL Hx:  She is currently sexually active.  In the past year there there has been NO new sexual partners.    Condoms are never used.  She would not like to be screened for STD's at today's exam.  Current birth control method: vasectomy.  She is happy with her current method of contraception and does not want to discuss alternative methods of contraception.  MENSTRUAL Hx:  Patient's last menstrual period was 2024 (approximate).  In the past 6 months her cycles have been regular, predictable and occur monthly.  Her menstrual flow is typically normal.   Each month on average there are roughly 0 day(s) of very heavy flow.  Intermenstrual bleeding is absent.    Post-coital bleeding is absent.  Dysmenorrhea: is not affecting her activities of daily living  PMS: is not affecting her activities of daily living  Her cycles are not a source of concern for her that she wishes to discuss today.  HEALTH Hx:  She exercises regularly: no (but is planning to start exercising more).  She wears her seat belt: yes.  She has concerns about domestic violence: no.    The following portions of the patient's history were reviewed and updated as appropriate:problem list, current medications, allergies, past family history, past medical history, past social history, and past surgical history.    Social History    Tobacco Use      Smoking status:  Never      Smokeless tobacco: Never    Review of Systems  Constitutional POS: nothing reported    NEG: anorexia or night sweats   Genitourinary POS: see HPI    NEG: dysuria or hematuria      Gastointestinal POS: see HPI    NEG: bloating, change in bowel habits, melena, or reflux symptoms   Integument POS: nothing reported and she sees her dermatologist for routine skins exams    NEG: moles that are changing in size, shape, color or rashes   Breast POS: nothing reported    NEG: persistent breast lump, skin dimpling, or nipple discharge        Objective   /72   Resp 14   Wt 80.7 kg (178 lb)   LMP 01/25/2024 (Approximate)   BMI 28.73 kg/m²     General:  well developed; well nourished  no acute distress   Skin:  No suspicious lesions seen   Thyroid: normal to inspection and palpation   Breasts:  Examined in supine position  Symmetric without masses or skin dimpling  Nipples normal without inversion, lesions or discharge  There are no palpable axillary nodes   Abdomen: soft, non-tender; no masses  no umbilical or inguinal hernias are present  no hepato-splenomegaly   Pelvis: Clinical staff was present for exam  External genitalia:  normal appearance of the external genitalia including Bartholin's and [place]'s glands.  :  urethral meatus normal;  Vaginal:  normal pink mucosa without prolapse or lesions.  Cervix:  normal appearance.  Uterus:  normal size, shape and consistency.  Adnexa:  normal bimanual exam of the adnexa.  Rectal:  digital rectal exam not performed; anus visually normal appearing.        Assessment   Normal GYN exam       Plan   Pap was done today.  If she does not receive the results of the Pap within 2 weeks  time, she was instructed to call to find out the results.  I explained to Paula that the recommendations for Pap smear interval in a low risk patient's has lengthened to 3 years time.  I encouraged her to be seen yearly for a full physical exam including breast and pelvic exam even  during the off years when PAP's will not be performed.  She was encouraged to get yearly mammograms.  She should report any palpable breast lump(s) or skin changes regardless of mammographic findings.  I explained to Paula that notification regarding her mammogram results will come from the center performing the study.  Our office will not be routinely calling with mammogram results.  It is her responsibility to make sure that the results from the mammogram are communicated to her by the breast center.  If she has any questions about the results, she is welcome to call our office anytime.  I have counseled the patient on the importance of staying up to date with preventive vaccines as well as the risks and benefits of these vaccines.  Her vaccine record was reviewed and updated.  I discussed with Paula that she may be behind on needed vaccinations for Influenza, TDAP, and COVID booster / COVID bivalent booster.  She may be able to obtain these vaccinations at her local pharmacy OR speak about obtaining them with her primary care.  If she does obtain her vaccines, I have asked Paula to let us know the date each vaccine was obtained so that her medical record could be updated in our system.  The importance of keeping all planned follow-up and taking all medications as prescribed was emphasized.  Follow up for annual exam 1 year           This note was electronically signed.    Kerwin Paez M.D.  February 1, 2024    Part of this note may be an electronic transcription/translation of spoken language to printed text using the Dragon Dictation System.

## 2024-02-01 ENCOUNTER — OFFICE VISIT (OUTPATIENT)
Dept: OBSTETRICS AND GYNECOLOGY | Facility: CLINIC | Age: 49
End: 2024-02-01
Payer: COMMERCIAL

## 2024-02-01 VITALS
DIASTOLIC BLOOD PRESSURE: 72 MMHG | RESPIRATION RATE: 14 BRPM | WEIGHT: 178 LBS | BODY MASS INDEX: 28.73 KG/M2 | SYSTOLIC BLOOD PRESSURE: 126 MMHG

## 2024-02-01 DIAGNOSIS — Z01.419 WELL WOMAN EXAM: Primary | ICD-10-CM

## 2024-02-01 DIAGNOSIS — Z71.85 VACCINE COUNSELING: ICD-10-CM

## 2024-02-01 DIAGNOSIS — N81.2 UTEROVAGINAL PROLAPSE, INCOMPLETE: ICD-10-CM

## 2024-02-01 DIAGNOSIS — N39.46 MIXED URGE AND STRESS INCONTINENCE: ICD-10-CM

## 2024-02-01 PROBLEM — I10 ESSENTIAL HYPERTENSION: Status: ACTIVE | Noted: 2023-01-01

## 2024-02-01 PROBLEM — I10 ESSENTIAL HYPERTENSION: Status: RESOLVED | Noted: 2023-01-01 | Resolved: 2024-02-01

## 2024-02-01 PROCEDURE — 99396 PREV VISIT EST AGE 40-64: CPT | Performed by: OBSTETRICS & GYNECOLOGY

## 2024-02-01 RX ORDER — FLUTICASONE PROPIONATE 50 MCG
SPRAY, SUSPENSION (ML) NASAL
COMMUNITY
Start: 2023-12-14

## 2024-02-01 NOTE — PATIENT INSTRUCTIONS
Influenza (Flu) Vaccine (Inactivated or Recombinant): What You Need to Know      1. Why get vaccinated?  Influenza vaccine can prevent influenza (flu).  Flu is a contagious disease that spreads around the [place] every year, usually between October and May. Anyone can get the flu, but it is more dangerous for some people. Infants and young children, people 65 years of age and older, pregnant women, and people with certain health conditions or a weakened immune system are at greatest risk of flu complications.  Pneumonia, bronchitis, sinus infections and ear infections are examples of flu-related complications. If you have a medical condition, such as heart disease, cancer or diabetes, flu can make it worse.  Flu can cause fever and chills, sore throat, muscle aches, fatigue, cough, headache, and runny or stuffy nose. Some people may have vomiting and diarrhea, though this is more common in children than adults.  Each year thousands of people in the [place] die from flu, and many more are hospitalized. Flu vaccine prevents millions of illnesses and flu-related visits to the doctor each year.  2. Influenza vaccine  CDC recommends everyone 6 months of age and older get vaccinated every flu season. Children 6 months through 8 years of age may need 2 doses during a single flu season. Everyone else needs only 1 dose each flu season.  It takes about 2 weeks for protection to develop after vaccination.  There are many flu viruses, and they are always changing. Each year a new flu vaccine is made to protect against three or four viruses that are likely to cause disease in the upcoming flu season. Even when the vaccine doesn't exactly match these viruses, it may still provide some protection.  Influenza vaccine does not cause flu.  Influenza vaccine may be given at the same time as other vaccines.  3. Talk with your health care provider  Tell your vaccine provider if the person getting the vaccine:  Has had an  allergic reaction after a previous dose of influenza vaccine, or has any severe, life-threatening allergies.  Has ever had Guillain-Barré Syndrome (also called GBS).  In some cases, your health care provider may decide to postpone influenza vaccination to a future visit.  People with minor illnesses, such as a cold, may be vaccinated. People who are moderately or severely ill should usually wait until they recover before getting influenza vaccine.  Your health care provider can give you more information.  4. Risks of a vaccine reaction  Soreness, redness, and swelling where shot is given, fever, muscle aches, and headache can happen after influenza vaccine.  There may be a very small increased risk of Guillain-Barré Syndrome (GBS) after inactivated influenza vaccine (the flu shot).  Young children who get the flu shot along with pneumococcal vaccine (PCV13), and/or DTaP vaccine at the same time might be slightly more likely to have a seizure caused by fever. Tell your health care provider if a child who is getting flu vaccine has ever had a seizure.  People sometimes faint after medical procedures, including vaccination. Tell your provider if you feel dizzy or have vision changes or ringing in the ears.  As with any medicine, there is a very remote chance of a vaccine causing a severe allergic reaction, other serious injury, or death.  5. What if there is a serious problem?  An allergic reaction could occur after the vaccinated person leaves the clinic. If you see signs of a severe allergic reaction (hives, swelling of the face and throat, difficulty breathing, a fast heartbeat, dizziness, or weakness), call 9-1-1 and get the person to the nearest hospital.  For other signs that concern you, call your health care provider.  Adverse reactions should be reported to the Vaccine Adverse Event Reporting System (VAERS). Your health care provider will usually file this report, or you can do it yourself. Visit the VAERS  website at www.vaers.[facility].gov or call 1-695.850.7301.VAERS is only for reporting reactions, and VAERS staff do not give medical advice.  6. The National Vaccine Injury Compensation Program  The National Vaccine Injury Compensation Program (VICP) is a federal program that was created to compensate people who may have been injured by certain vaccines. Visit the VICP website at www.[facility].gov/vaccinecompensation or call 1-371.292.4144 to learn about the program and about filing a claim. There is a time limit to file a claim for compensation.  7. How can I learn more?  Ask your healthcare provider.  Call your local or state health department.  Contact the Centers for Disease Control and Prevention (CDC):  Call 1-424.192.3932 (9-060-WSA-INFO) or  Visit CDC's www.cdc.gov/flu    Vaccine Information Statement (Interim) Inactivated Influenza Vaccine (8/15/2019)  This information is not intended to replace advice given to you by your health care provider. Make sure you discuss any questions you have with your health care provider.  Document Released: 10/12/2007 Document Revised: 2020 Document Reviewed: 2019  Elsevier Patient Education ©  [employer]        Tdap Vaccine (Tetanus, Diphtheria and Pertussis): What You Need to Know      1. Why get vaccinated?  Tetanus, diphtheria and pertussis are very serious diseases. Tdap vaccine can protect us from these diseases. And, Tdap vaccine given to pregnant women can protect  babies against pertussis..  TETANUS (Lockjaw) is rare in the [place] today. It causes painful muscle tightening and stiffness, usually all over the body.  It can lead to tightening of muscles in the head and neck so you can't open your mouth, swallow, or sometimes even breathe. Tetanus kills about 1 out of 10 people who are infected even after receiving the best medical care.  DIPHTHERIA is also rare in the [place] today. It can cause a thick coating to form in the back of the  throat.  It can lead to breathing problems, heart failure, paralysis, and death.  PERTUSSIS (Whooping Cough) causes severe coughing spells, which can cause difficulty breathing, vomiting and disturbed sleep.  It can also lead to weight loss, incontinence, and rib fractures. Up to 2 in 100 adolescents and 5 in 100 adults with pertussis are hospitalized or have complications, which could include pneumonia or death.    These diseases are caused by bacteria. Diphtheria and pertussis are spread from person to person through secretions from coughing or sneezing. Tetanus enters the body through cuts, scratches, or wounds.  Before vaccines, as many as 200,000 cases of diphtheria, 200,000 cases of pertussis, and hundreds of cases of tetanus, were reported in the [place] each year. Since vaccination began, reports of cases for tetanus and diphtheria have dropped by about 99% and for pertussis by about 80%.    2. Tdap vaccine  Tdap vaccine can protect adolescents and adults from tetanus, diphtheria, and pertussis. One dose of Tdap is routinely given at age 11 or 12. People who did not get Tdap at that age should get it as soon as possible.  Tdap is especially important for healthcare professionals and anyone having close contact with a baby younger than 12 months.  Pregnant women should get a dose of Tdap during every pregnancy, to protect the  from pertussis. Infants are most at risk for severe, life-threatening complications from pertussis.  Another vaccine, called Td, protects against tetanus and diphtheria, but not pertussis. A Td booster should be given every 10 years. Tdap may be given as one of these boosters if you have never gotten Tdap before. Tdap may also be given after a severe cut or burn to prevent tetanus infection.  Your doctor or the person giving you the vaccine can give you more information.  Tdap may safely be given at the same time as other vaccines.    3. Some people should not get this  vaccine  A person who has ever had a life-threatening allergic reaction after a previous dose of any diphtheria, tetanus or pertussis containing vaccine, OR has a severe allergy to any part of this vaccine, should not get Tdap vaccine. Tell the person giving the vaccine about any severe allergies.  Anyone who had coma or long repeated seizures within 7 days after a childhood dose of DTP or DTaP, or a previous dose of Tdap, should not get Tdap, unless a cause other than the vaccine was found. They can still get Td.  Talk to your doctor if you:  have seizures or another nervous system problem,  had severe pain or swelling after any vaccine containing diphtheria, tetanus or pertussis,  ever had a condition called Guillain-Barré Syndrome (GBS),  aren't feeling well on the day the shot is scheduled.    4. Risks  With any medicine, including vaccines, there is a chance of side effects. These are usually mild and go away on their own. Serious reactions are also possible but are rare.  Most people who get Tdap vaccine do not have any problems with it.  Mild problems following Tdap  (Did not interfere with activities)  Pain where the shot was given (about 3 in 4 adolescents or 2 in 3 adults)  Redness or swelling where the shot was given (about 1 person in 5)  Mild fever of at least 100.4°F (up to about 1 in 25 adolescents or 1 in 100 adults)  Headache (about 3 or 4 people in 10)  Tiredness (about 1 person in 3 or 4)  Nausea, vomiting, diarrhea, stomach ache (up to 1 in 4 adolescents or 1 in 10 adults)  Chills, sore joints (about 1 person in 10)  Body aches (about 1 person in 3 or 4)  Rash, swollen glands (uncommon)  Moderate problems following Tdap  (Interfered with activities, but did not require medical attention)  Pain where the shot was given (up to 1 in 5 or 6)  Redness or swelling where the shot was given (up to about 1 in 16 adolescents or 1 in 12 adults)  Fever over 102°F (about 1 in 100 adolescents or 1 in 250  adults)  Headache (about 1 in 7 adolescents or 1 in 10 adults)  Nausea, vomiting, diarrhea, stomach ache (up to 1 or 3 people in 100)  Swelling of the entire arm where the shot was given (up to about 1 in 500).  Severe problems following Tdap  (Unable to perform usual activities; required medical attention)  Swelling, severe pain, bleeding and redness in the arm where the shot was given (rare).  Problems that could happen after any vaccine:  People sometimes faint after a medical procedure, including vaccination. Sitting or lying down for about 15 minutes can help prevent fainting, and injuries caused by a fall. Tell your doctor if you feel dizzy, or have vision changes or ringing in the ears.  Some people get severe pain in the shoulder and have difficulty moving the arm where a shot was given. This happens very rarely.  Any medication can cause a severe allergic reaction. Such reactions from a vaccine are very rare, estimated at fewer than 1 in a million doses, and would happen within a few minutes to a few hours after the vaccination.  As with any medicine, there is a very remote chance of a vaccine causing a serious injury or death.  The safety of vaccines is always being monitored. For more information, visit: www.cdc.gov/vaccinesafety/    5. What if there is a serious problem?  What should I look for?  Look for anything that concerns you, such as signs of a severe allergic reaction, very high fever, or unusual behavior.  Signs of a severe allergic reaction can include hives, swelling of the face and throat, difficulty breathing, a fast heartbeat, dizziness, and weakness. These would usually start a few minutes to a few hours after the vaccination.  What should I do?  If you think it is a severe allergic reaction or other emergency that can't wait, call 9-1-1 or get the person to the nearest hospital. Otherwise, call your doctor.  Afterward, the reaction should be reported to the Vaccine Adverse Event Reporting  System ([employer]). Your doctor might file this report, or you can do it yourself through the [employer] web site at www.vaers.[facility].gov, or by calling 1-105.160.3373.  [employer] does not give medical advice.    6. The National Vaccine Injury Compensation Program  The National Vaccine Injury Compensation Program (VICP) is a federal program that was created to compensate people who may have been injured by certain vaccines.  Persons who believe they may have been injured by a vaccine can learn about the program and about filing a claim by calling 1-712.428.1592 or visiting the VICP website at www.[facility].gov/vaccinecompensation. There is a time limit to file a claim for compensation.    7. How can I learn more?  Ask your doctor. He or she can give you the vaccine package insert or suggest other sources of information.  Call your local or state health department.  Contact the Centers for Disease Control and Prevention (CDC):  Call 1-255.230.6222 (0-165-GHG-INFO) or  Visit CDC's website at www.cdc.gov/vaccines      Vaccine Information Statement Tdap Vaccine (2/24/2015)  This information is not intended to replace advice given to you by your health care provider. Make sure you discuss any questions you have with your health care provider.  Document Released: 06/18/2013 Document Revised: 08/05/2019 Document Reviewed: 08/05/2019  Elsevier Interactive Patient Education © 2019 Elsevier Inc.

## 2024-02-05 LAB — REF LAB TEST METHOD: NORMAL

## 2024-04-19 NOTE — TELEPHONE ENCOUNTER
Rx Refill Note  Pending Prescriptions:                       Disp   Refills    Linzess 145 MCG capsule capsule [Pharmacy *90 cap*0        Sig: TAKE 1 CAPSULE BY MOUTH ONCE DAILY 30  MINUTES            BEFORE  BREAKFAST    Last office visit with prescribing clinician: 12/14/2023   Last telemedicine visit with prescribing clinician: Visit date not found   Next office visit with prescribing clinician: 6/13/2024         Lexus Banda MA  04/19/24, 12:57 EDT

## 2024-06-13 ENCOUNTER — OFFICE VISIT (OUTPATIENT)
Dept: GASTROENTEROLOGY | Facility: CLINIC | Age: 49
End: 2024-06-13
Payer: COMMERCIAL

## 2024-06-13 VITALS
HEART RATE: 56 BPM | BODY MASS INDEX: 27.97 KG/M2 | OXYGEN SATURATION: 99 % | HEIGHT: 66 IN | DIASTOLIC BLOOD PRESSURE: 72 MMHG | SYSTOLIC BLOOD PRESSURE: 118 MMHG | TEMPERATURE: 96.9 F | WEIGHT: 174 LBS

## 2024-06-13 DIAGNOSIS — K50.019 CROHN'S DISEASE OF SMALL INTESTINE WITH COMPLICATION: Primary | ICD-10-CM

## 2024-06-13 RX ORDER — ONDANSETRON 4 MG/1
TABLET, ORALLY DISINTEGRATING ORAL
COMMUNITY
Start: 2024-03-15

## 2024-06-13 NOTE — PROGRESS NOTES
PCP: Provider, No Known    Chief Complaint   Patient presents with    Follow-up       History of Present Illness:   Paula Quispe is a 49 y.o. female who presents to GI clinic as a follow up for crohn's disease, fatty liver, constipation..  No fever. Has had many years of constipation. No gib loss. Feeling well.       Past Medical History:   Diagnosis Date    Chronic constipation     Resolved after partial colectomy    COVID 2021    Crohn's disease     Endometriosis     Essential hypertension     Large bowel obstruction 2016    Small bowel obstruction 2021    Admitted in [place], [place]       Past Surgical History:   Procedure Laterality Date     SECTION      COLONOSCOPY      DIAGNOSTIC LAPAROSCOPY      Dx with endometriosis    ILEOSTOMY CLOSURE  2016    Dr. Hughes - Takedown    LUMBAR DISCECTOMY      SUBTOTAL COLECTOMY  2016    Dr. Hughes - Sigmoid colectomy for bowel obstruction with mucous fistula creastion    TONSILLECTOMY           Current Outpatient Medications:     Cetirizine-Pseudoephedrine (ZYRTEC-D PO), Every 12 (Twelve) Hours., Disp: , Rfl:     fluticasone (FLONASE) 50 MCG/ACT nasal spray, , Disp: , Rfl:     hydroCHLOROthiazide (HYDRODIURIL) 25 MG tablet, Take 1 tablet by mouth Daily., Disp: , Rfl:     linaclotide (Linzess) 145 MCG capsule capsule, TAKE 1 CAPSULE BY MOUTH ONCE DAILY 30  MINUTES  BEFORE  BREAKFAST, Disp: 90 capsule, Rfl: 0    losartan (COZAAR) 50 MG tablet, Take 1 tablet by mouth Daily., Disp: , Rfl:     Allergies   Allergen Reactions    Phenergan [Promethazine Hcl] Hives       Family History   Problem Relation Age of Onset    Cervical cancer Mother     Lung cancer Mother     Breast cancer Maternal Aunt 70    Breast cancer Maternal Aunt 80    Ovarian cancer Neg Hx     Colon cancer Neg Hx     Colon polyps Neg Hx     Esophageal cancer Neg Hx        Social History     Socioeconomic History    Marital status:    Tobacco Use    Smoking  status: Never    Smokeless tobacco: Never   Vaping Use    Vaping status: Never Used   Substance and Sexual Activity    Alcohol use: No    Drug use: No       Review of Systems  A complete 12 point ros was asked and is negative except for that mentioned above.  In particular:  No fever  No rash  No increased arthralgias  No worsening edema  No cough  No dyspnea  No chest pain      There were no vitals filed for this visit.    Physical Exam  General: well developed, well nourished  A+O x 3 NAD  HEENT: NCAT, pupils equal appearing, sclera appear white  NECK: full ROM  Respiratory: symmetric chest rise, normal effort, normal work of breathing, no overt rales  Abdomen: non-distended  Skin: normal color, no jaundice  Neuro: no tremor, no facial droop  Psych: normal mood and affect      Assessment/Plan  1.) Crohn's disease History of colon resection, 2016  2.) Apthous ulcers at surgical anastamosis  3.) History of benign abdominal mass, inflammatory, granulomatous  4.) history of poorly healing ileostomy wound   5.) Chronic diarrhea with loss of icv  6.) Recurrent psbo, last 8/2021    Suspect crohn's disease +/- component of IBS-C  Background:  - colonoscopy: last 4/2023: healed up anastamosis ulcers, normal;  - benign inflammatory mass resection  - CTE 9/2021: no abnormality at anastamosis; increased stool burden in distal colon  - psbo management 8/2021 at [facility]  - due to no overt obstruction, bowel prep given and no improvement in symptoms  -   Due to 3 apthous ulcers at anastamosis and continued discomfort with h/o psbo 8/2021, entyvio was started      Plan:  Cbc, cmp, crp; she states she had labs drawn by pcp which we don't have; will try to acquire.  She would like to go to [facility] entyvio but I do not recommend this while her glp-1 analog is being changed/switched  Continue entyvio    7.) Anemia  Follows with dr. Mooney, repeat hemogram hg : 12  Repeat cbc    8) medication management  Cmp reviewed. Mild ast/alt elevation  low 40s due to non alcoholic fatty liver    9.) IBS-C  continue linzess 145, no sign of psbo, previous constipation issues preceding crohn's diagnosis    10.) Abnormal alt, [place]  Exercise/diet/ wt loss; repeat cmp in a few months      Moises Page MD  6/13/2024

## 2024-07-22 NOTE — TELEPHONE ENCOUNTER
Rx Refill Note  Pending Prescriptions:                       Disp   Refills    linaclotide (Linzess) 145 MCG capsule caps*90 cap*0        Sig: TAKE 1 CAPSULE BY MOUTH ONCE DAILY 30  MINUTES            BEFORE  BREAKFAST    Last office visit with prescribing clinician: 6/13/2024   Last telemedicine visit with prescribing clinician: Visit date not found   Next office visit with prescribing clinician: 12/16/2024         Lexus Banda MA  07/22/24, 08:54 EDT

## 2024-09-23 ENCOUNTER — TELEPHONE (OUTPATIENT)
Dept: GASTROENTEROLOGY | Facility: CLINIC | Age: 49
End: 2024-09-23
Payer: COMMERCIAL

## 2024-10-21 NOTE — TELEPHONE ENCOUNTER
Rx Refill Note  Pending Prescriptions:                       Disp   Refills    linaclotide (Linzess) 145 MCG capsule caps*90 cap*0        Sig: TAKE 1 CAPSULE BY MOUTH ONCE DAILY 30  MINUTES            BEFORE  BREAKFAST    Last office visit with prescribing clinician: 6/13/2024   Last telemedicine visit with prescribing clinician: Visit date not found   Next office visit with prescribing clinician: 12/16/2024         Lexus Banda MA  10/21/24, 07:26 EDT

## 2024-10-29 ENCOUNTER — TRANSCRIBE ORDERS (OUTPATIENT)
Dept: ADMINISTRATIVE | Facility: HOSPITAL | Age: 49
End: 2024-10-29
Payer: COMMERCIAL

## 2024-10-29 ENCOUNTER — OFFICE VISIT (OUTPATIENT)
Dept: OBSTETRICS AND GYNECOLOGY | Facility: CLINIC | Age: 49
End: 2024-10-29
Payer: COMMERCIAL

## 2024-10-29 VITALS — WEIGHT: 174 LBS | BODY MASS INDEX: 28.1 KG/M2

## 2024-10-29 DIAGNOSIS — R93.89 ABNORMAL FINDING ON CT SCAN: Primary | ICD-10-CM

## 2024-10-29 DIAGNOSIS — Z12.31 SCREENING MAMMOGRAM FOR BREAST CANCER: Primary | ICD-10-CM

## 2024-10-29 PROCEDURE — 99213 OFFICE O/P EST LOW 20 MIN: CPT | Performed by: OBSTETRICS & GYNECOLOGY

## 2024-10-29 NOTE — PROGRESS NOTES
Subjective   Chief Complaint   Patient presents with    Follow-up       Paula Quispe is a 49 y.o. year old .  Patient's last menstrual period was 10/07/2024 (approximate).  She comes today to talk about findings on a CAT scan.  She had a CAT scan done at an outside institution.  The CAT scan was done for abdominal pain.  The pain has resolved itself.  The CAT scan demonstrated a 1.4 cm lesion of the cervix anterior to it and she comes to be evaluated.  She is having no gynecologic complaints other than a little bit of cramps the week before her menses.  She does have Crohn's disease and it has been stable.  She realizes her Crohn's is exacerbated by Wegovy and has stopped taking that.    The following portions of the patient's history were reviewed and updated as appropriate:current medications and allergies    Social History    Tobacco Use      Smoking status: Never      Smokeless tobacco: Never         Objective   Wt 78.9 kg (174 lb)   LMP 10/07/2024 (Approximate)   BMI 28.10 kg/m²     Lab Review   No data reviewed    Imaging   CT of abdomen/pelvis report        Assessment   Anterior cervical lesion, incidentally seen at recent CT scan.  Anticipate this to be a benign, incidental finding     Plan   Ultrasound needs to be done any time that is convenient for her.   The importance of keeping all planned follow-up and taking all medications as prescribed was emphasized.  Follow up PRN             This note was electronically signed.    Kerwin Paez M.D.  2024      Part of this note may be an electronic transcription/translation of spoken language to printed text using the Dragon Dictation System.

## 2024-11-05 ENCOUNTER — HOSPITAL ENCOUNTER (OUTPATIENT)
Dept: MAMMOGRAPHY | Facility: HOSPITAL | Age: 49
Discharge: HOME | End: 2024-11-05
Admitting: OBSTETRICS & GYNECOLOGY
Payer: COMMERCIAL

## 2024-11-05 DIAGNOSIS — Z12.31 SCREENING MAMMOGRAM FOR BREAST CANCER: ICD-10-CM

## 2024-11-05 PROCEDURE — 77067 SCR MAMMO BI INCL CAD: CPT

## 2024-11-05 PROCEDURE — 77063 BREAST TOMOSYNTHESIS BI: CPT

## 2024-11-07 ENCOUNTER — TELEPHONE (OUTPATIENT)
Dept: OBSTETRICS AND GYNECOLOGY | Facility: CLINIC | Age: 49
End: 2024-11-07
Payer: COMMERCIAL

## 2024-11-07 NOTE — TELEPHONE ENCOUNTER
Let patient know her ultrasound was normal.  The lesion they were seen on the CAT scan most likely represents a benign nabothian cyst of the cervix and no additional workup warranted treatment is necessary

## 2024-12-16 ENCOUNTER — LAB (OUTPATIENT)
Dept: LAB | Facility: HOSPITAL | Age: 49
End: 2024-12-16
Payer: COMMERCIAL

## 2024-12-16 ENCOUNTER — OFFICE VISIT (OUTPATIENT)
Dept: GASTROENTEROLOGY | Facility: CLINIC | Age: 49
End: 2024-12-16
Payer: COMMERCIAL

## 2024-12-16 VITALS
WEIGHT: 180 LBS | TEMPERATURE: 97.8 F | HEIGHT: 66 IN | DIASTOLIC BLOOD PRESSURE: 90 MMHG | SYSTOLIC BLOOD PRESSURE: 132 MMHG | HEART RATE: 92 BPM | BODY MASS INDEX: 28.93 KG/M2 | OXYGEN SATURATION: 98 %

## 2024-12-16 DIAGNOSIS — K50.019 CROHN'S DISEASE OF SMALL INTESTINE WITH COMPLICATION: Primary | ICD-10-CM

## 2024-12-16 DIAGNOSIS — K50.019 CROHN'S DISEASE OF SMALL INTESTINE WITH COMPLICATION: ICD-10-CM

## 2024-12-16 LAB
ALBUMIN SERPL-MCNC: 4.1 G/DL (ref 3.5–5.2)
ALBUMIN/GLOB SERPL: 1.2 G/DL
ALP SERPL-CCNC: 70 U/L (ref 39–117)
ALT SERPL W P-5'-P-CCNC: 83 U/L (ref 1–33)
ANION GAP SERPL CALCULATED.3IONS-SCNC: 9.5 MMOL/L (ref 5–15)
AST SERPL-CCNC: 63 U/L (ref 1–32)
BILIRUB SERPL-MCNC: 0.3 MG/DL (ref 0–1.2)
BUN SERPL-MCNC: 15 MG/DL (ref 6–20)
BUN/CREAT SERPL: 19.7 (ref 7–25)
CALCIUM SPEC-SCNC: 9.1 MG/DL (ref 8.6–10.5)
CHLORIDE SERPL-SCNC: 102 MMOL/L (ref 98–107)
CO2 SERPL-SCNC: 27.5 MMOL/L (ref 22–29)
CREAT SERPL-MCNC: 0.76 MG/DL (ref 0.57–1)
CRP SERPL-MCNC: <0.3 MG/DL (ref 0–0.5)
DEPRECATED RDW RBC AUTO: 38.5 FL (ref 37–54)
EGFRCR SERPLBLD CKD-EPI 2021: 96.2 ML/MIN/1.73
ERYTHROCYTE [DISTWIDTH] IN BLOOD BY AUTOMATED COUNT: 11.8 % (ref 12.3–15.4)
GLOBULIN UR ELPH-MCNC: 3.3 GM/DL
GLUCOSE SERPL-MCNC: 115 MG/DL (ref 65–99)
HCT VFR BLD AUTO: 38.3 % (ref 34–46.6)
HGB BLD-MCNC: 12.8 G/DL (ref 12–15.9)
MCH RBC QN AUTO: 30.1 PG (ref 26.6–33)
MCHC RBC AUTO-ENTMCNC: 33.4 G/DL (ref 31.5–35.7)
MCV RBC AUTO: 90.1 FL (ref 79–97)
PLATELET # BLD AUTO: 317 10*3/MM3 (ref 140–450)
PMV BLD AUTO: 10.2 FL (ref 6–12)
POTASSIUM SERPL-SCNC: 3.6 MMOL/L (ref 3.5–5.2)
PROT SERPL-MCNC: 7.4 G/DL (ref 6–8.5)
RBC # BLD AUTO: 4.25 10*6/MM3 (ref 3.77–5.28)
SODIUM SERPL-SCNC: 139 MMOL/L (ref 136–145)
WBC NRBC COR # BLD AUTO: 7.19 10*3/MM3 (ref 3.4–10.8)

## 2024-12-16 PROCEDURE — 85027 COMPLETE CBC AUTOMATED: CPT

## 2024-12-16 PROCEDURE — 99214 OFFICE O/P EST MOD 30 MIN: CPT | Performed by: INTERNAL MEDICINE

## 2024-12-16 PROCEDURE — 86140 C-REACTIVE PROTEIN: CPT

## 2024-12-16 PROCEDURE — 80053 COMPREHEN METABOLIC PANEL: CPT

## 2024-12-16 NOTE — PROGRESS NOTES
PCP: Solange Lees APRN    No chief complaint on file.      History of Present Illness:   Paula Quispe is a 49 y.o. female who presents to GI clinic as a follow up for crohn's disease and constipation. Doing well. Tolerating entyvio without issue. No gib loss or abdominal pain.    Past Medical History:   Diagnosis Date    Chronic constipation     Resolved after partial colectomy    COVID 2021    Crohn's disease     Endometriosis     Essential hypertension     Large bowel obstruction 2016    Small bowel obstruction 2021    Admitted in [place], [place]       Past Surgical History:   Procedure Laterality Date     SECTION      COLONOSCOPY      DIAGNOSTIC LAPAROSCOPY      Dx with endometriosis    ILEOSTOMY CLOSURE  2016    Dr. Hughes - Takedown    LUMBAR DISCECTOMY      SUBTOTAL COLECTOMY  2016    Dr. Hughes - Sigmoid colectomy for bowel obstruction with mucous fistula creastion    TONSILLECTOMY           Current Outpatient Medications:     Cetirizine-Pseudoephedrine (ZYRTEC-D PO), Every 12 (Twelve) Hours., Disp: , Rfl:     fluticasone (FLONASE) 50 MCG/ACT nasal spray, , Disp: , Rfl:     hydroCHLOROthiazide (HYDRODIURIL) 25 MG tablet, Take 1 tablet by mouth Daily., Disp: , Rfl:     linaclotide (Linzess) 145 MCG capsule capsule, TAKE 1 CAPSULE BY MOUTH ONCE DAILY 30  MINUTES  BEFORE  BREAKFAST, Disp: 90 capsule, Rfl: 0    losartan (COZAAR) 50 MG tablet, Take 1 tablet by mouth Daily., Disp: , Rfl:     ondansetron ODT (ZOFRAN-ODT) 4 MG disintegrating tablet, , Disp: , Rfl:     Allergies   Allergen Reactions    Phenergan [Promethazine Hcl] Hives       Family History   Problem Relation Age of Onset    Cervical cancer Mother     Lung cancer Mother     Breast cancer Maternal Aunt 70    Breast cancer Maternal Aunt 80    Ovarian cancer Neg Hx     Colon cancer Neg Hx     Colon polyps Neg Hx     Esophageal cancer Neg Hx        Social History     Socioeconomic History    Marital  status:    Tobacco Use    Smoking status: Never    Smokeless tobacco: Never   Vaping Use    Vaping status: Never Used   Substance and Sexual Activity    Alcohol use: No    Drug use: No       Review of Systems  A complete 12 point ros was asked and is negative except for that mentioned above.  In particular:  No fever  No rash  No increased arthralgias  No worsening edema  No cough  No dyspnea  No chest pain      There were no vitals filed for this visit.    Physical Exam  General: well developed, well nourished  A+O x 3 NAD  HEENT: NCAT, pupils equal appearing, sclera appear white  NECK: full ROM  Respiratory: symmetric chest rise, normal effort, normal work of breathing, no overt rales  Abdomen: non-distended  Skin: normal color, no jaundice  Neuro: no tremor, no facial droop  Psych: normal mood and affect      Assessment/Plan  1.) Crohn's disease History of colon resection, 2016  2.) Apthous ulcers at surgical anastamosis  3.) History of benign abdominal mass, inflammatory, granulomatous  4.) history of poorly healing ileostomy wound   5.) Chronic diarrhea with loss of icv  6.) Recurrent psbo, last 8/2021    Suspect crohn's disease +/- component of IBS-C  Background:  - colonoscopy: last 4/2023: healed up anastamosis ulcers, normal;  - benign inflammatory mass resection  - CTE 9/2021: no abnormality at anastamosis; increased stool burden in distal colon  - psbo management 8/2021 at [facility]  - due to no overt obstruction, bowel prep given and no improvement in symptoms  -   Due to 3 apthous ulcers at anastamosis and continued discomfort with h/o psbo 8/2021, entyvio was started      Plan:  Cbc, cmp, crp; she states she had labs drawn by pcp which we don't have; will try to acquire.  She would like to go to [facility] entyvio but I do not recommend this while her glp-1 analog is being changed/switched  Continue entyvio    7.) Anemia  Follows with dr. Mooney, repeat hemogram hg : 12  Repeat cbc    8) medication  management  Cmp reviewed. Mild ast/alt elevation low 40s due to non alcoholic fatty liver    9.) IBS-C  continue linzess 145, no sign of psbo, previous constipation issues preceding crohn's diagnosis    10.) Abnormal alt, [place]  Exercise/diet/ wt loss; repeat cmp in a few months      Moises Page MD  12/16/2024

## 2025-01-13 NOTE — TELEPHONE ENCOUNTER
Rx Refill Note  Pending Prescriptions:                       Disp   Refills    linaclotide (Linzess) 145 MCG capsule caps*90 cap*0        Sig: TAKE 1 CAPSULE BY MOUTH ONCE DAILY 30  MINUTES            BEFORE  BREAKFAST    Last office visit with prescribing clinician: 12/16/2024   Last telemedicine visit with prescribing clinician: Visit date not found   Next office visit with prescribing clinician: 6/17/2025         Lexus Banda MA  01/13/25, 15:43 EST

## 2025-03-31 DIAGNOSIS — R79.89 ABNORMAL LIVER FUNCTION TESTS: Primary | ICD-10-CM

## 2025-04-14 NOTE — TELEPHONE ENCOUNTER
Rx Refill Note  Requested Prescriptions     Pending Prescriptions Disp Refills    linaclotide (Linzess) 145 MCG capsule capsule 90 capsule 0     Sig: TAKE 1 CAPSULE BY MOUTH ONCE DAILY 30  MINUTES  BEFORE  BREAKFAST      Last office visit with prescribing clinician: 12/16/2024   Last telemedicine visit with prescribing clinician: Visit date not found   Next office visit with prescribing clinician: 6/17/2025                           Lexus Banda MA  04/14/25, 07:46 EDT

## 2025-04-26 ENCOUNTER — HOSPITAL ENCOUNTER (OUTPATIENT)
Facility: HOSPITAL | Age: 50
Discharge: HOME | End: 2025-04-26
Payer: COMMERCIAL

## 2025-04-26 DIAGNOSIS — R79.89 ABNORMAL LIVER FUNCTION TESTS: ICD-10-CM

## 2025-04-26 PROCEDURE — 74183 MRI ABD W/O CNTR FLWD CNTR: CPT

## 2025-04-26 PROCEDURE — A9577 INJ MULTIHANCE: HCPCS | Performed by: INTERNAL MEDICINE

## 2025-04-26 PROCEDURE — 25510000002 GADOBENATE DIMEGLUMINE 529 MG/ML SOLUTION: Performed by: INTERNAL MEDICINE

## 2025-04-26 RX ADMIN — GADOBENATE DIMEGLUMINE 15 ML: 529 INJECTION, SOLUTION INTRAVENOUS at 17:02

## 2025-05-01 ENCOUNTER — RESULTS FOLLOW-UP (OUTPATIENT)
Dept: GASTROENTEROLOGY | Facility: CLINIC | Age: 50
End: 2025-05-01
Payer: COMMERCIAL

## 2025-05-01 NOTE — LETTER
May 2, 2025     Paula Quispe  [address]  [facility] [address]      Dear Ms. Quispe:    Below are the results from your recent visit:    Resulted Orders   MRI abdomen w wo contrast mrcp    Narrative    MRI ABDOMEN W WO CONTRAST MRCP    Date of Exam: 4/26/2025 4:14 PM EDT    Indication: Abnormal liver enzymes.     Comparison: CT abdomen and pelvis 11/3/2021    Technique:  Routine multiplanar/multisequence images of the abdomen were obtained with MRCP sequences before and after the uneventful administration of 16 mL Multihance.    Findings:  Lung bases are without consolidation. Negative for pericardial effusion or pleural effusion.    Liver is normal in size and contour. There is loss of signal in the hepatic parenchyma on out of phase imaging consistent with underlying hepatic steatosis. No suspicious liver lesion.    The gallbladder is present. Negative for cholelithiasis. No gallbladder wall thickening or pericholecystic fluid. The common bile duct measures 6 mm which is normal. There is normal tapering of the distal CBD. No visualized choledocholithiasis.    The spleen is normal in size. Both adrenal glands are normal.     The pancreas is normal in T1 signal intensity. No pancreatic main duct dilatation. No findings of acute pancreatitis.    The kidneys are symmetrically enhancing. No hydronephrosis or hydroureter. There are several small simple renal cysts noted. Representative cyst at the upper pole left kidney measures 1.7 cm. Representative cyst at the lower pole right kidney measures   1.3 cm.    Postsurgical changes at the midline related to prior colonic resection partially imaged. No dilated bowel loops to indicate obstruction. The portal vein, splenic vein, and superior mesenteric vein are patent. Abdominal aortic branch vasculature are   patent. Negative for aortic aneurysm.      Impression    Impression:  1. No acute abnormality in the abdomen.  2. Hepatic steatosis.  3. Normal gallbladder.  Normal caliber common bile duct. No choledocholithiasis.  4. Additional chronic findings above.            Electronically Signed: Darek Aguilar MD    5/1/2025 10:34 AM EDT    Workstation ID: [id number]       I am happy to report that your MRI does not show stones blocking your bile duct or any acute abnormality. Liver has fatty liver disease which means two things: 1.) Avoid irritants such as alcohol 2.) Exercise/diet for goal wt loss of 1 lb per month over the next year. Overall, very good result.       If you have any questions or concerns, please don't hesitate to call.         Sincerely,        Moises Page MD

## 2025-06-05 ENCOUNTER — TELEPHONE (OUTPATIENT)
Dept: GASTROENTEROLOGY | Facility: CLINIC | Age: 50
End: 2025-06-05

## 2025-06-05 NOTE — TELEPHONE ENCOUNTER
CALLED PT TO RS HER FOLLOW UP APPT WITH DR EPSTEIN ON 6/17 AS HE IS OUT OF THE OFFICE THAT DAY. GOT VOICEMAIL SO LEFT A MESSAGE LETTING HER KNOW WE ARE CANCELING HER APPT AT THIS TIME AND TO CALL BACK TO GET RS WITH EITHER DR EPSTEIN OR ZEESHAN.

## 2025-07-15 NOTE — TELEPHONE ENCOUNTER
Rx Refill Note  Requested Prescriptions     Pending Prescriptions Disp Refills    linaclotide (Linzess) 145 MCG capsule capsule 90 capsule 0     Sig: TAKE 1 CAPSULE BY MOUTH ONCE DAILY 30  MINUTES  BEFORE  BREAKFAST      Last office visit with prescribing clinician: 12/16/2024   Last telemedicine visit with prescribing clinician: Visit date not found   Next office visit with prescribing clinician: Visit date not found                         Would you like a call back once the refill request has been completed: [] Yes [] No    If the office needs to give you a call back, can they leave a voicemail: [] Yes [] No    Phuong Fuentes MA  07/15/25, 11:48 EDT

## 2025-07-16 NOTE — TELEPHONE ENCOUNTER
Rx Refill Note  Requested Prescriptions     Pending Prescriptions Disp Refills    linaclotide (Linzess) 145 MCG capsule capsule 90 capsule 0     Sig: TAKE 1 CAPSULE BY MOUTH ONCE DAILY 30  MINUTES  BEFORE  BREAKFAST      Last office visit with prescribing clinician: 12/16/2024   Last telemedicine visit with prescribing clinician: Visit date not found   Next office visit with prescribing clinician: Visit date not found                         Lexus Banda MA  07/16/25, 09:31 EDT